# Patient Record
Sex: MALE | Race: WHITE | NOT HISPANIC OR LATINO | Employment: STUDENT | URBAN - METROPOLITAN AREA
[De-identification: names, ages, dates, MRNs, and addresses within clinical notes are randomized per-mention and may not be internally consistent; named-entity substitution may affect disease eponyms.]

---

## 2023-07-07 ENCOUNTER — APPOINTMENT (OUTPATIENT)
Dept: CT IMAGING | Facility: CLINIC | Age: 23
End: 2023-07-07
Attending: EMERGENCY MEDICINE
Payer: COMMERCIAL

## 2023-07-07 ENCOUNTER — HOSPITAL ENCOUNTER (EMERGENCY)
Facility: CLINIC | Age: 23
Discharge: HOME | End: 2023-07-07
Attending: EMERGENCY MEDICINE | Admitting: EMERGENCY MEDICINE
Payer: COMMERCIAL

## 2023-07-07 VITALS
DIASTOLIC BLOOD PRESSURE: 93 MMHG | OXYGEN SATURATION: 98 % | WEIGHT: 160 LBS | RESPIRATION RATE: 16 BRPM | SYSTOLIC BLOOD PRESSURE: 133 MMHG | HEIGHT: 74 IN | HEART RATE: 89 BPM | BODY MASS INDEX: 20.53 KG/M2 | TEMPERATURE: 97 F

## 2023-07-07 DIAGNOSIS — N13.5 OBSTRUCTION OF RIGHT URETEROPELVIC JUNCTION (UPJ): ICD-10-CM

## 2023-07-07 DIAGNOSIS — R79.89 ELEVATED SERUM CREATININE: ICD-10-CM

## 2023-07-07 DIAGNOSIS — Q62.11 HYDRONEPHROSIS WITH URETEROPELVIC JUNCTION (UPJ) OBSTRUCTION: Primary | ICD-10-CM

## 2023-07-07 LAB
ALBUMIN UR-MCNC: NEGATIVE MG/DL
ANION GAP SERPL CALCULATED.3IONS-SCNC: 9 MMOL/L (ref 7–15)
APPEARANCE UR: CLEAR
BASOPHILS # BLD AUTO: 0.1 10E3/UL (ref 0–0.2)
BASOPHILS NFR BLD AUTO: 1 %
BILIRUB UR QL STRIP: NEGATIVE
BUN SERPL-MCNC: 14.3 MG/DL (ref 6–20)
CALCIUM SERPL-MCNC: 8.9 MG/DL (ref 8.6–10)
CHLORIDE SERPL-SCNC: 98 MMOL/L (ref 98–107)
COLOR UR AUTO: ABNORMAL
CREAT SERPL-MCNC: 1.48 MG/DL (ref 0.67–1.17)
DEPRECATED HCO3 PLAS-SCNC: 26 MMOL/L (ref 22–29)
EOSINOPHIL # BLD AUTO: 0.1 10E3/UL (ref 0–0.7)
EOSINOPHIL NFR BLD AUTO: 1 %
ERYTHROCYTE [DISTWIDTH] IN BLOOD BY AUTOMATED COUNT: 11.7 % (ref 10–15)
GFR SERPL CREATININE-BSD FRML MDRD: 67 ML/MIN/1.73M2
GLUCOSE SERPL-MCNC: 97 MG/DL (ref 70–99)
GLUCOSE UR STRIP-MCNC: NEGATIVE MG/DL
HCT VFR BLD AUTO: 43.4 % (ref 40–53)
HGB BLD-MCNC: 15.5 G/DL (ref 13.3–17.7)
HGB UR QL STRIP: NEGATIVE
IMM GRANULOCYTES # BLD: 0.1 10E3/UL
IMM GRANULOCYTES NFR BLD: 0 %
KETONES UR STRIP-MCNC: 60 MG/DL
LEUKOCYTE ESTERASE UR QL STRIP: NEGATIVE
LYMPHOCYTES # BLD AUTO: 1.1 10E3/UL (ref 0.8–5.3)
LYMPHOCYTES NFR BLD AUTO: 8 %
MCH RBC QN AUTO: 32.2 PG (ref 26.5–33)
MCHC RBC AUTO-ENTMCNC: 35.7 G/DL (ref 31.5–36.5)
MCV RBC AUTO: 90 FL (ref 78–100)
MONOCYTES # BLD AUTO: 1.1 10E3/UL (ref 0–1.3)
MONOCYTES NFR BLD AUTO: 9 %
MUCOUS THREADS #/AREA URNS LPF: PRESENT /LPF
NEUTROPHILS # BLD AUTO: 10.5 10E3/UL (ref 1.6–8.3)
NEUTROPHILS NFR BLD AUTO: 81 %
NITRATE UR QL: NEGATIVE
NRBC # BLD AUTO: 0 10E3/UL
NRBC BLD AUTO-RTO: 0 /100
PH UR STRIP: 6 [PH] (ref 5–7)
PLATELET # BLD AUTO: 342 10E3/UL (ref 150–450)
POTASSIUM SERPL-SCNC: 3.8 MMOL/L (ref 3.4–5.3)
RBC # BLD AUTO: 4.82 10E6/UL (ref 4.4–5.9)
RBC URINE: 1 /HPF
SODIUM SERPL-SCNC: 133 MMOL/L (ref 136–145)
SP GR UR STRIP: 1.02 (ref 1–1.03)
UROBILINOGEN UR STRIP-MCNC: NORMAL MG/DL
WBC # BLD AUTO: 12.9 10E3/UL (ref 4–11)
WBC URINE: <1 /HPF

## 2023-07-07 PROCEDURE — 99285 EMERGENCY DEPT VISIT HI MDM: CPT | Mod: 25

## 2023-07-07 PROCEDURE — 85025 COMPLETE CBC W/AUTO DIFF WBC: CPT | Performed by: EMERGENCY MEDICINE

## 2023-07-07 PROCEDURE — 81001 URINALYSIS AUTO W/SCOPE: CPT | Performed by: EMERGENCY MEDICINE

## 2023-07-07 PROCEDURE — 36415 COLL VENOUS BLD VENIPUNCTURE: CPT | Performed by: EMERGENCY MEDICINE

## 2023-07-07 PROCEDURE — 74176 CT ABD & PELVIS W/O CONTRAST: CPT

## 2023-07-07 PROCEDURE — 250N000011 HC RX IP 250 OP 636: Mod: JZ | Performed by: EMERGENCY MEDICINE

## 2023-07-07 PROCEDURE — 80048 BASIC METABOLIC PNL TOTAL CA: CPT | Performed by: EMERGENCY MEDICINE

## 2023-07-07 PROCEDURE — 96374 THER/PROPH/DIAG INJ IV PUSH: CPT

## 2023-07-07 RX ORDER — KETOROLAC TROMETHAMINE 15 MG/ML
15 INJECTION, SOLUTION INTRAMUSCULAR; INTRAVENOUS
Status: COMPLETED | OUTPATIENT
Start: 2023-07-07 | End: 2023-07-07

## 2023-07-07 RX ORDER — OXYCODONE HYDROCHLORIDE 5 MG/1
5 TABLET ORAL EVERY 6 HOURS PRN
Qty: 12 TABLET | Refills: 0 | Status: SHIPPED | OUTPATIENT
Start: 2023-07-07 | End: 2023-07-10

## 2023-07-07 RX ORDER — SENNA AND DOCUSATE SODIUM 50; 8.6 MG/1; MG/1
1-2 TABLET, FILM COATED ORAL 2 TIMES DAILY PRN
Qty: 30 TABLET | Refills: 0 | Status: ON HOLD | OUTPATIENT
Start: 2023-07-07 | End: 2023-07-25

## 2023-07-07 RX ORDER — ONDANSETRON 4 MG/1
4 TABLET, ORALLY DISINTEGRATING ORAL EVERY 6 HOURS PRN
Qty: 14 TABLET | Refills: 0 | Status: ON HOLD | OUTPATIENT
Start: 2023-07-07 | End: 2023-07-25

## 2023-07-07 RX ADMIN — KETOROLAC TROMETHAMINE 15 MG: 15 INJECTION, SOLUTION INTRAMUSCULAR; INTRAVENOUS at 08:06

## 2023-07-07 ASSESSMENT — ACTIVITIES OF DAILY LIVING (ADL)
ADLS_ACUITY_SCORE: 35
ADLS_ACUITY_SCORE: 33

## 2023-07-07 NOTE — ED NOTES
"  History     Chief Complaint:  Flank Pain       HPI   Amador Jose is a 23 year old male with no significant past medical history who presents to the ED via/accompanied by self with a chief complaint of intermittent right flank pain, nonradiating onset approximately 36 hours ago associated with intermittent nausea, worse in the last 12 hours and improving shortly after arriving in the emergency department.  Patient denies chest pain, shortness of breath, vomiting, changes in bowel movements, changes in urination, pleuritic pain, specific alleviating or worsening factors.  He reports his last dose of Tylenol was at 0600 this morning.      Independent Historian: Patient provides the history    Review of External Notes: See MDM    ROS:  Review of Systems  Full ROS completed and negative other than pertinent positives and negatives noted in HPI    Allergies:  No Known Allergies     Medications:    ondansetron (ZOFRAN ODT) 4 MG ODT tab  oxyCODONE (ROXICODONE) 5 MG tablet  SENNA-docusate sodium (SENNA S) 8.6-50 MG tablet        Past Medical History:    None  Past Surgical History:    None    Family History:    family history is not on file.    Social History:     PCP: No primary care provider on file.     Physical Exam     Patient Vitals for the past 24 hrs:   BP Temp Temp src Pulse Resp SpO2 Height Weight   07/07/23 0609 (!) 157/101 97  F (36.1  C) Temporal 79 16 99 % 1.88 m (6' 2\") 72.6 kg (160 lb)        Physical Exam  Constitutional: Well developed, nontox appearance  Head: Atraumatic.   Neck:  no stridor  Eyes: no scleral icterus  Cardiovascular: RRR, 2+ bilat radial, DP pulses  Pulmonary/Chest: nml resp effort  Abdominal: ND, soft, NT, no rebound or guarding   : R CVA tenderness   Ext: Warm, well perfuse  Neurological: A&O, symmetric facies, moves ext x4  Skin: Skin is warm and dry.   Psychiatric: Behavior is normal. Thought content normal.   Nursing note and vitals reviewed.    Emergency Department Course "   ECG:  No results found for this or any previous visit.    Imaging:  CT Abdomen Pelvis w/o Contrast   Final Result   IMPRESSION:    1.  Moderate to severe right hydroureteronephrosis with abrupt   transition to normal caliber at the ureteropelvic junction. No   radiodense filling defect. Findings are favored to be due to chronic   intrinsic UPJ obstruction or extrinsic compression from a crossing   vessel.      TREVOR STANFORD MD            SYSTEM ID:  [id number]           Report per radiology unless otherwise specified in report or noted in MDM    Laboratory:  Labs Ordered and Resulted from Time of ED Arrival to Time of ED Departure   ROUTINE UA WITH MICROSCOPIC REFLEX TO CULTURE - Abnormal       Result Value    Color Urine Light Yellow      Appearance Urine Clear      Glucose Urine Negative      Bilirubin Urine Negative      Ketones Urine 60 (*)     Specific Gravity Urine 1.022      Blood Urine Negative      pH Urine 6.0      Protein Albumin Urine Negative      Urobilinogen Urine Normal      Nitrite Urine Negative      Leukocyte Esterase Urine Negative      Mucus Urine Present (*)     RBC Urine 1      WBC Urine <1     BASIC METABOLIC PANEL - Abnormal    Sodium 133 (*)     Potassium 3.8      Chloride 98      Carbon Dioxide (CO2) 26      Anion Gap 9      Urea Nitrogen 14.3      Creatinine 1.48 (*)     Calcium 8.9      Glucose 97      GFR Estimate 67     CBC WITH PLATELETS AND DIFFERENTIAL - Abnormal    WBC Count 12.9 (*)     RBC Count 4.82      Hemoglobin 15.5      Hematocrit 43.4      MCV 90      MCH 32.2      MCHC 35.7      RDW 11.7      Platelet Count 342      % Neutrophils 81      % Lymphocytes 8      % Monocytes 9      % Eosinophils 1      % Basophils 1      % Immature Granulocytes 0      NRBCs per 100 WBC 0      Absolute Neutrophils 10.5 (*)     Absolute Lymphocytes 1.1      Absolute Monocytes 1.1      Absolute Eosinophils 0.1      Absolute Basophils 0.1      Absolute Immature Granulocytes 0.1       Absolute NRBCs 0.0          Procedures       Emergency Department Course & Assessments:             Interventions:  Medications   ketorolac (TORADOL) injection 15 mg (15 mg Intravenous $Given 23 0806)        Independent Interpretation (X-rays, CTs, rhythm strip):  See MDM    Consultations/Discussion of Management or Tests:  I discussed patient with on-call urology, Sil Phipps    Social Determinants of Health affecting care:  See MDM    Disposition:  The patient was discharged to home.     Impression & Plan    CMS Diagnoses: None  Medical Decision Makin year old male presenting w/ right flank pain    Social determinants affecting patient's health include:  No significant social determinants negatively affecting the patient's health     I reviewed medical records from  for medicine office visit on 10/11/2022 for an overview of the patient's previous medical history    DDx includes ureteral stone, renal colic, UTI, pyelonephritis, atypical presentation of appendicitis, biliary colic, less likely.  Doubt vascular catastrophe given risk factors and history.  Labs significant for mild leukocytosis, elevated creatinine.  Imaging sig for findings concerning for right UPJ obstruction without evidence of ureteral stone.  I discussed the patient and case with on-call urology who recommended outpatient follow-up with the patient's pain is well controlled through likely surgical intervention.  Interventions as noted above with improvement in symptoms.  Upon recheck, the patient's pain is well controlled.  Plan for him to follow-up as an outpatient and discuss potential treatment options relayed from urology team.  At this time I feel the pt is safe for discharge.  Recommendations given regarding follow up with PCP, urology and return to the emergency department as needed for new or worsening symptoms.  Pt counseled on all results, disposition and diagnosis.  They are understanding and agreeable to plan. Patient  discharged in stable condition.        Diagnosis:    ICD-10-CM    1. Obstruction of right ureteropelvic junction (UPJ)  N13.5       2. Elevated serum creatinine  R79.89            Discharge Medications:  New Prescriptions    ONDANSETRON (ZOFRAN ODT) 4 MG ODT TAB    Take 1 tablet (4 mg) by mouth every 6 hours as needed for nausea or vomiting    OXYCODONE (ROXICODONE) 5 MG TABLET    Take 1 tablet (5 mg) by mouth every 6 hours as needed for pain    SENNA-DOCUSATE SODIUM (SENNA S) 8.6-50 MG TABLET    Take 1-2 tablets by mouth 2 times daily as needed (if taking oxycodone)        7/7/2023   Fareed Fagan MD Vaughn, Christopher E, MD  07/07/23 0907

## 2023-07-07 NOTE — ED TRIAGE NOTES
Right flank pain started 24 hr ago.  Took Tylenol 1000mg at 0600. Denies need for pain medication at this time

## 2023-07-07 NOTE — ED PROVIDER NOTES
"History      Chief Complaint:  Flank Pain        HPI   Amador Jose is a 23 year old male with no significant past medical history who presents to the ED via/accompanied by self with a chief complaint of intermittent right flank pain, nonradiating onset approximately 36 hours ago associated with intermittent nausea, worse in the last 12 hours and improving shortly after arriving in the emergency department.  Patient denies chest pain, shortness of breath, vomiting, changes in bowel movements, changes in urination, pleuritic pain, specific alleviating or worsening factors.  He reports his last dose of Tylenol was at 0600 this morning.        Independent Historian: Patient provides the history     Review of External Notes: See MDM     ROS:  Review of Systems  Full ROS completed and negative other than pertinent positives and negatives noted in HPI     Allergies:  No Known Allergies      Medications:    ondansetron (ZOFRAN ODT) 4 MG ODT tab  oxyCODONE (ROXICODONE) 5 MG tablet  SENNA-docusate sodium (SENNA S) 8.6-50 MG tablet           Past Medical History:    None  Past Surgical History:    None     Family History:    family history is not on file.     Social History:  PCP: No primary care provider on file.      Physical Exam      Patient Vitals for the past 24 hrs:    BP Temp Temp src Pulse Resp SpO2 Height Weight   07/07/23 0609 (!) 157/101 97  F (36.1  C) Temporal 79 16 99 % 1.88 m (6' 2\") 72.6 kg (160 lb)         Physical Exam  Constitutional: Well developed, nontox appearance  Head: Atraumatic.   Neck:  no stridor  Eyes: no scleral icterus  Cardiovascular: RRR, 2+ bilat radial, DP pulses  Pulmonary/Chest: nml resp effort  Abdominal: ND, soft, NT, no rebound or guarding   : R CVA tenderness   Ext: Warm, well perfuse  Neurological: A&O, symmetric facies, moves ext x4  Skin: Skin is warm and dry.   Psychiatric: Behavior is normal. Thought content normal.   Nursing note and vitals reviewed.     Emergency " Department Course   ECG:  No results found for this or any previous visit.     Imaging:  CT Abdomen Pelvis w/o Contrast   Final Result   IMPRESSION:    1.  Moderate to severe right hydroureteronephrosis with abrupt   transition to normal caliber at the ureteropelvic junction. No   radiodense filling defect. Findings are favored to be due to chronic   intrinsic UPJ obstruction or extrinsic compression from a crossing   vessel.       TREVOR STANFORD MD            SYSTEM ID:  [id number]             Report per radiology unless otherwise specified in report or noted in MDM     Laboratory:        Labs Ordered and Resulted from Time of ED Arrival to Time of ED Departure   ROUTINE UA WITH MICROSCOPIC REFLEX TO CULTURE - Abnormal       Result Value      Color Urine Light Yellow        Appearance Urine Clear        Glucose Urine Negative        Bilirubin Urine Negative        Ketones Urine 60 (*)       Specific Gravity Urine 1.022        Blood Urine Negative        pH Urine 6.0        Protein Albumin Urine Negative        Urobilinogen Urine Normal        Nitrite Urine Negative        Leukocyte Esterase Urine Negative        Mucus Urine Present (*)       RBC Urine 1        WBC Urine <1      BASIC METABOLIC PANEL - Abnormal     Sodium 133 (*)       Potassium 3.8        Chloride 98        Carbon Dioxide (CO2) 26        Anion Gap 9        Urea Nitrogen 14.3        Creatinine 1.48 (*)       Calcium 8.9        Glucose 97        GFR Estimate 67      CBC WITH PLATELETS AND DIFFERENTIAL - Abnormal     WBC Count 12.9 (*)       RBC Count 4.82        Hemoglobin 15.5        Hematocrit 43.4        MCV 90        MCH 32.2        MCHC 35.7        RDW 11.7        Platelet Count 342        % Neutrophils 81        % Lymphocytes 8        % Monocytes 9        % Eosinophils 1        % Basophils 1        % Immature Granulocytes 0        NRBCs per 100 WBC 0        Absolute Neutrophils 10.5 (*)       Absolute Lymphocytes 1.1        Absolute  Monocytes 1.1        Absolute Eosinophils 0.1        Absolute Basophils 0.1        Absolute Immature Granulocytes 0.1        Absolute NRBCs 0.0            Procedures         Emergency Department Course & Assessments:              Interventions:  Medications   ketorolac (TORADOL) injection 15 mg (15 mg Intravenous $Given 23 0806)         Independent Interpretation (X-rays, CTs, rhythm strip):  See MDM     Consultations/Discussion of Management or Tests:  I discussed patient with on-call urology, Sil Phipps     Social Determinants of Health affecting care:  See MDM     Disposition:  The patient was discharged to home.      Impression & Plan    CMS Diagnoses: None  Medical Decision Makin year old male presenting w/ right flank pain     Social determinants affecting patient's health include:  No significant social determinants negatively affecting the patient's health     I reviewed medical records from  for medicine office visit on 10/11/2022 for an overview of the patient's previous medical history     DDx includes ureteral stone, renal colic, UTI, pyelonephritis, atypical presentation of appendicitis, biliary colic, less likely.  Doubt vascular catastrophe given risk factors and history.  Labs significant for mild leukocytosis, elevated creatinine.  Imaging sig for findings concerning for right UPJ obstruction without evidence of ureteral stone.  I discussed the patient and case with on-call urology who recommended outpatient follow-up with the patient's pain is well controlled through likely surgical intervention.  Interventions as noted above with improvement in symptoms.  Upon recheck, the patient's pain is well controlled.  Plan for him to follow-up as an outpatient and discuss potential treatment options relayed from urology team.  At this time I feel the pt is safe for discharge.  Recommendations given regarding follow up with PCP, urology and return to the emergency department as needed for new  or worsening symptoms.  Pt counseled on all results, disposition and diagnosis.  They are understanding and agreeable to plan. Patient discharged in stable condition.          Diagnosis:      ICD-10-CM     1. Obstruction of right ureteropelvic junction (UPJ)  N13.5         2. Elevated serum creatinine  R79.89               Discharge Medications:       New Prescriptions     ONDANSETRON (ZOFRAN ODT) 4 MG ODT TAB    Take 1 tablet (4 mg) by mouth every 6 hours as needed for nausea or vomiting     OXYCODONE (ROXICODONE) 5 MG TABLET    Take 1 tablet (5 mg) by mouth every 6 hours as needed for pain     SENNA-DOCUSATE SODIUM (SENNA S) 8.6-50 MG TABLET    Take 1-2 tablets by mouth 2 times daily as needed (if taking oxycodone)         7/7/2023   Fareed Fagan MD Vaughn, Christopher E, MD  07/07/23 0907       Fareed Fagan MD  07/07/23 0283

## 2023-07-07 NOTE — DISCHARGE INSTRUCTIONS
1. -Take acetaminophen 500 to 1000 mg by mouth every 4 to 6 hours as needed for pain or fever.  Do not take more than 4000 mg in 24 hours.  Do not take within 6 hours of another acetaminophen containing medication such as norco (vicodin) or percocet.  - Take ibuprofen 600 to 800 mg by mouth every 6 to 8 hours as needed for pain or fever  2. Please return to the ED as needed for new or worsening symptoms such as severe and uncontrollable pain, vomiting and unable to keep anything down, fever greater than 100.4 F, any other concerning symptoms.

## 2023-07-07 NOTE — ED NOTES
Pt refusing Toradol at this time. He verbalizes understanding to call if he decides he would like pain medication.

## 2023-07-11 ENCOUNTER — VIRTUAL VISIT (OUTPATIENT)
Dept: UROLOGY | Facility: CLINIC | Age: 23
End: 2023-07-11
Payer: COMMERCIAL

## 2023-07-11 ENCOUNTER — HOSPITAL ENCOUNTER (OUTPATIENT)
Dept: NUCLEAR MEDICINE | Facility: CLINIC | Age: 23
Setting detail: NUCLEAR MEDICINE
Discharge: HOME | End: 2023-07-11
Attending: PHYSICIAN ASSISTANT | Admitting: PHYSICIAN ASSISTANT
Payer: COMMERCIAL

## 2023-07-11 VITALS — WEIGHT: 160 LBS | BODY MASS INDEX: 20.53 KG/M2 | HEIGHT: 74 IN

## 2023-07-11 DIAGNOSIS — N13.5 OBSTRUCTION OF RIGHT URETEROPELVIC JUNCTION (UPJ): Primary | ICD-10-CM

## 2023-07-11 DIAGNOSIS — Q62.11 HYDRONEPHROSIS WITH URETEROPELVIC JUNCTION (UPJ) OBSTRUCTION: ICD-10-CM

## 2023-07-11 PROCEDURE — 250N000011 HC RX IP 250 OP 636: Mod: JZ | Performed by: PHYSICIAN ASSISTANT

## 2023-07-11 PROCEDURE — 78708 K FLOW/FUNCT IMAGE W/DRUG: CPT

## 2023-07-11 PROCEDURE — 343N000001 HC RX 343: Performed by: PHYSICIAN ASSISTANT

## 2023-07-11 PROCEDURE — A9562 TC99M MERTIATIDE: HCPCS | Performed by: PHYSICIAN ASSISTANT

## 2023-07-11 PROCEDURE — 99204 OFFICE O/P NEW MOD 45 MIN: CPT | Mod: VID | Performed by: STUDENT IN AN ORGANIZED HEALTH CARE EDUCATION/TRAINING PROGRAM

## 2023-07-11 RX ORDER — FUROSEMIDE 10 MG/ML
40 INJECTION INTRAMUSCULAR; INTRAVENOUS ONCE
Status: COMPLETED | OUTPATIENT
Start: 2023-07-11 | End: 2023-07-11

## 2023-07-11 RX ORDER — CEFAZOLIN SODIUM 2 G/50ML
2 SOLUTION INTRAVENOUS
Status: CANCELLED | OUTPATIENT
Start: 2023-07-11

## 2023-07-11 RX ORDER — CEFAZOLIN SODIUM 2 G/50ML
2 SOLUTION INTRAVENOUS SEE ADMIN INSTRUCTIONS
Status: CANCELLED | OUTPATIENT
Start: 2023-07-11

## 2023-07-11 RX ADMIN — TECHNESCAN TC 99M MERTIATIDE 8.8 MILLICURIE: 1 INJECTION, POWDER, LYOPHILIZED, FOR SOLUTION INTRAVENOUS at 08:30

## 2023-07-11 RX ADMIN — FUROSEMIDE 40 MG: 10 INJECTION, SOLUTION INTRAMUSCULAR; INTRAVENOUS at 08:50

## 2023-07-11 ASSESSMENT — PAIN SCALES - GENERAL: PAINLEVEL: NO PAIN (0)

## 2023-07-11 NOTE — LETTER
7/11/2023       RE: Amador Jose  [address] [facility] [address]     Dear Colleague,    Thank you for referring your patient, Amador Jose, to the [facility] [place] at [facility]. Please see a copy of my visit note below.    ** Send link to cell phone    Amador is a 23 year old who is being evaluated via a billable video visit.      How would you like to obtain your AVS? MyChart  If the video visit is dropped, the invitation should be resent by: Text to cell phone: [phone]  Will anyone else be joining your video visit? No      [facility]  Main Office: [address]  [address]  [place] [address]       CHIEF COMPLAINT:  Right flank pain    HISTORY:   22 yo M with right flank pain x1 year with acute worsening last week prompting ER visit    Patient notices intermittent pain especially with increased fluid intake    Denies any UTI. Denies any prior  surgery    Patient is starting at [employer], orientation starting August 14th. He recently moved into his new apartment in [facility] [facility] [place]      PAST MEDICAL HISTORY: History reviewed. No pertinent past medical history.    PAST SURGICAL HISTORY: History reviewed. No pertinent surgical history.    FAMILY HISTORY: History reviewed. No pertinent family history.    SOCIAL HISTORY:   Social History     Tobacco Use    Smoking status: Never    Smokeless tobacco: Never   Substance Use Topics    Alcohol use: Not on file          Allergies   Allergen Reactions    Fluorescein-Benoxinate Other (See Comments)         Current Outpatient Medications:     ondansetron (ZOFRAN ODT) 4 MG ODT tab, Take 1 tablet (4 mg) by mouth every 6 hours as needed for nausea or vomiting (Patient not taking: Reported on 7/11/2023), Disp: 14 tablet, Rfl: 0    SENNA-docusate sodium (SENNA S) 8.6-50 MG tablet, Take 1-2 tablets by mouth 2 times daily as needed (if taking oxycodone) (Patient  "not taking: Reported on 7/11/2023), Disp: 30 tablet, Rfl: 0  No current facility-administered medications for this visit.    Review Of Systems:  Skin: No rash, pruritis, or skin pigmentation  Eyes: No changes in vision  Ears/Nose/Throat: No changes in hearing, no nosebleeds  Respiratory: No shortness of breath, dyspnea on exertion, cough, or hemoptysis  Cardiovascular: No chest pain or palpitations  Gastrointestinal: No diarrhea or constipation. No abdominal pain. No hematochezia  Genitourinary: see HPI  Musculoskeletal: No pain or swelling of joints, normal range of motion  Neurologic: No weakness or tremors  Psychiatric: No recent changes in memory or mood  Hematologic/Lymphatic/Immunologic: No easy bruising or enlarged lymph nodes  Endocrine: No weight gain or loss      PHYSICAL EXAM:    Ht 1.88 m (6' 2\")   Wt 72.6 kg (160 lb)   BMI 20.54 kg/m    General appearance: In NAD, conversant  HEENT: Normocephalic and atraumatic, anicteric sclera  Cardiovascular: Not examined  Respiratory: normal, non-labored breathing  Musculoskeletal: Not Examined  Peripheral Vascular/extremity: No peripheral edema  Skin: Normal temperature, turgor, and texture. No rash  Psychiatric: Appropriate affect, alert and oriented to person, place, and time      Cystoscopy: Not done        UA RESULTS:  Recent Labs   Lab Test 07/07/23  0616   COLOR Light Yellow   APPEARANCE Clear   URINEGLC Negative   URINEBILI Negative   URINEKETONE 60*   SG 1.022   UBLD Negative   URINEPH 6.0   PROTEIN Negative   NITRITE Negative   LEUKEST Negative   RBCU 1   WBCU <1       Bladder Scan:     Other Labs:      Imaging Studies:     NM renal scan 7/11/2023    Reviewed and interpreted personally and discussed with patient        Right renal uptake without significant excretion, consistent with right UPJO    CT 7/7/2023 reviewed and interpreted personally    Right hydronephrosis without hydroureter    CLINICAL IMPRESSION:   22 yo M with 1 year of intermittent right " flank pain found to have right ureteropelvic junction obstruction. Clinical course is classic (Dietl's crisis). 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 Robotic assisted laparoscopic dismembered pyeloplasty vs. Laser endopyelotomy vs. Chronic indwelling ureteral stent. Pyeloplasty with the best long term outcomes. Risks of pyeloplasty including damage to bowel, ureteral leak, ureteral stenosis/recurrent UPJO, infection, hematuria discussed. Plan for postop stent for 6 weeks. 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 Location): Home  Distant Location (provider location):  On-site  Platform used for Video Visit: Hemal

## 2023-07-11 NOTE — PROGRESS NOTES
** Send link to cell phone    Amador is a 23 year old who is being evaluated via a billable video visit.      How would you like to obtain your AVS? MyChart  If the video visit is dropped, the invitation should be resent by: Text to cell phone: [phone]  Will anyone else be joining your video visit? No      [facility]  Main Office: [address]  [address]  [place] [address]       CHIEF COMPLAINT:  Right flank pain    HISTORY:   24 yo M with right flank pain x1 year with acute worsening last week prompting ER visit    Patient notices intermittent pain especially with increased fluid intake    Denies any UTI. Denies any prior  surgery    Patient is starting at [employer], orientation starting August 14th. He recently moved into his new apartment in [place]      PAST MEDICAL HISTORY: History reviewed. No pertinent past medical history.    PAST SURGICAL HISTORY: History reviewed. No pertinent surgical history.    FAMILY HISTORY: History reviewed. No pertinent family history.    SOCIAL HISTORY:   Social History     Tobacco Use     Smoking status: Never     Smokeless tobacco: Never   Substance Use Topics     Alcohol use: Not on file          Allergies   Allergen Reactions     Fluorescein-Benoxinate Other (See Comments)         Current Outpatient Medications:      ondansetron (ZOFRAN ODT) 4 MG ODT tab, Take 1 tablet (4 mg) by mouth every 6 hours as needed for nausea or vomiting (Patient not taking: Reported on 7/11/2023), Disp: 14 tablet, Rfl: 0     SENNA-docusate sodium (SENNA S) 8.6-50 MG tablet, Take 1-2 tablets by mouth 2 times daily as needed (if taking oxycodone) (Patient not taking: Reported on 7/11/2023), Disp: 30 tablet, Rfl: 0  No current facility-administered medications for this visit.    Review Of Systems:  Skin: No rash, pruritis, or skin pigmentation  Eyes: No changes in vision  Ears/Nose/Throat: No changes in hearing, no nosebleeds  Respiratory: No shortness  "of breath, dyspnea on exertion, cough, or hemoptysis  Cardiovascular: No chest pain or palpitations  Gastrointestinal: No diarrhea or constipation. No abdominal pain. No hematochezia  Genitourinary: see HPI  Musculoskeletal: No pain or swelling of joints, normal range of motion  Neurologic: No weakness or tremors  Psychiatric: No recent changes in memory or mood  Hematologic/Lymphatic/Immunologic: No easy bruising or enlarged lymph nodes  Endocrine: No weight gain or loss      PHYSICAL EXAM:    Ht 1.88 m (6' 2\")   Wt 72.6 kg (160 lb)   BMI 20.54 kg/m    General appearance: In NAD, conversant  HEENT: Normocephalic and atraumatic, anicteric sclera  Cardiovascular: Not examined  Respiratory: normal, non-labored breathing  Musculoskeletal: Not Examined  Peripheral Vascular/extremity: No peripheral edema  Skin: Normal temperature, turgor, and texture. No rash  Psychiatric: Appropriate affect, alert and oriented to person, place, and time      Cystoscopy: Not done        UA RESULTS:  Recent Labs   Lab Test 07/07/23  0616   COLOR Light Yellow   APPEARANCE Clear   URINEGLC Negative   URINEBILI Negative   URINEKETONE 60*   SG 1.022   UBLD Negative   URINEPH 6.0   PROTEIN Negative   NITRITE Negative   LEUKEST Negative   RBCU 1   WBCU <1       Bladder Scan:     Other Labs:      Imaging Studies:     NM renal scan 7/11/2023    Reviewed and interpreted personally and discussed with patient        Right renal uptake without significant excretion, consistent with right UPJO    CT 7/7/2023 reviewed and interpreted personally    Right hydronephrosis without hydroureter    CLINICAL IMPRESSION:   22 yo M with 1 year of intermittent right flank pain found to have right ureteropelvic junction obstruction. Clinical course is classic (Dietl's crisis). 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 Robotic assisted " laparoscopic dismembered pyeloplasty vs. Laser endopyelotomy vs. Chronic indwelling ureteral stent. Pyeloplasty with the best long term outcomes. Risks of pyeloplasty including damage to bowel, ureteral leak, ureteral stenosis/recurrent UPJO, infection, hematuria discussed. Plan for postop stent for 6 weeks. 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 Location): Home  Distant Location (provider location):  On-site  Platform used for Video Visit: Hemal

## 2023-07-11 NOTE — H&P (VIEW-ONLY)
** Send link to cell phone    Amador is a 23 year old who is being evaluated via a billable video visit.      How would you like to obtain your AVS? MyChart  If the video visit is dropped, the invitation should be resent by: Text to cell phone: [phone]  Will anyone else be joining your video visit? No      [facility]  Main Office: [address]  [address]  [place] [address]       CHIEF COMPLAINT:  Right flank pain    HISTORY:   24 yo M with right flank pain x1 year with acute worsening last week prompting ER visit    Patient notices intermittent pain especially with increased fluid intake    Denies any UTI. Denies any prior  surgery    Patient is starting at [employer], orientation starting August 14th. He recently moved into his new apartment in [facility]      PAST MEDICAL HISTORY: History reviewed. No pertinent past medical history.    PAST SURGICAL HISTORY: History reviewed. No pertinent surgical history.    FAMILY HISTORY: History reviewed. No pertinent family history.    SOCIAL HISTORY:   Social History     Tobacco Use     Smoking status: Never     Smokeless tobacco: Never   Substance Use Topics     Alcohol use: Not on file          Allergies   Allergen Reactions     Fluorescein-Benoxinate Other (See Comments)         Current Outpatient Medications:      ondansetron (ZOFRAN ODT) 4 MG ODT tab, Take 1 tablet (4 mg) by mouth every 6 hours as needed for nausea or vomiting (Patient not taking: Reported on 7/11/2023), Disp: 14 tablet, Rfl: 0     SENNA-docusate sodium (SENNA S) 8.6-50 MG tablet, Take 1-2 tablets by mouth 2 times daily as needed (if taking oxycodone) (Patient not taking: Reported on 7/11/2023), Disp: 30 tablet, Rfl: 0  No current facility-administered medications for this visit.    Review Of Systems:  Skin: No rash, pruritis, or skin pigmentation  Eyes: No changes in vision  Ears/Nose/Throat: No changes in hearing, no nosebleeds  Respiratory: No shortness  "of breath, dyspnea on exertion, cough, or hemoptysis  Cardiovascular: No chest pain or palpitations  Gastrointestinal: No diarrhea or constipation. No abdominal pain. No hematochezia  Genitourinary: see HPI  Musculoskeletal: No pain or swelling of joints, normal range of motion  Neurologic: No weakness or tremors  Psychiatric: No recent changes in memory or mood  Hematologic/Lymphatic/Immunologic: No easy bruising or enlarged lymph nodes  Endocrine: No weight gain or loss      PHYSICAL EXAM:    Ht 1.88 m (6' 2\")   Wt 72.6 kg (160 lb)   BMI 20.54 kg/m    General appearance: In NAD, conversant  HEENT: Normocephalic and atraumatic, anicteric sclera  Cardiovascular: Not examined  Respiratory: normal, non-labored breathing  Musculoskeletal: Not Examined  Peripheral Vascular/extremity: No peripheral edema  Skin: Normal temperature, turgor, and texture. No rash  Psychiatric: Appropriate affect, alert and oriented to person, place, and time      Cystoscopy: Not done        UA RESULTS:  Recent Labs   Lab Test 07/07/23  0616   COLOR Light Yellow   APPEARANCE Clear   URINEGLC Negative   URINEBILI Negative   URINEKETONE 60*   SG 1.022   UBLD Negative   URINEPH 6.0   PROTEIN Negative   NITRITE Negative   LEUKEST Negative   RBCU 1   WBCU <1       Bladder Scan:     Other Labs:      Imaging Studies:     NM renal scan 7/11/2023    Reviewed and interpreted personally and discussed with patient        Right renal uptake without significant excretion, consistent with right UPJO    CT 7/7/2023 reviewed and interpreted personally    Right hydronephrosis without hydroureter    CLINICAL IMPRESSION:   24 yo M with 1 year of intermittent right flank pain found to have right ureteropelvic junction obstruction. Clinical course is classic (Dietl's crisis). 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 Robotic assisted " laparoscopic dismembered pyeloplasty vs. Laser endopyelotomy vs. Chronic indwelling ureteral stent. Pyeloplasty with the best long term outcomes. Risks of pyeloplasty including damage to bowel, ureteral leak, ureteral stenosis/recurrent UPJO, infection, hematuria discussed. Plan for postop stent for 6 weeks. N.b. 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 Location): Home  Distant Location (provider location):  On-site  Platform used for Video Visit: Hemal

## 2023-07-13 ENCOUNTER — TELEPHONE (OUTPATIENT)
Dept: UROLOGY | Facility: CLINIC | Age: 23
End: 2023-07-13
Payer: COMMERCIAL

## 2023-07-13 NOTE — TELEPHONE ENCOUNTER
Type of surgery Robot assisted Right Pyeloplasty right stent  Location of surgery: [facility]  Date and time of surgery:7/25/23 1:00pm  Surgeon:Dr Mckee  Pre-Op Appt Date Will use ED note from 7/7/23  Post-Op Appt Date: TBD   Packet sent out: Yes    Maria E

## 2023-07-18 ENCOUNTER — ANCILLARY PROCEDURE (OUTPATIENT)
Dept: CT IMAGING | Facility: CLINIC | Age: 23
End: 2023-07-18
Attending: STUDENT IN AN ORGANIZED HEALTH CARE EDUCATION/TRAINING PROGRAM
Payer: COMMERCIAL

## 2023-07-18 DIAGNOSIS — N13.5 OBSTRUCTION OF RIGHT URETEROPELVIC JUNCTION (UPJ): ICD-10-CM

## 2023-07-18 PROCEDURE — 74178 CT ABD&PLV WO CNTR FLWD CNTR: CPT

## 2023-07-18 PROCEDURE — 250N000009 HC RX 250: Performed by: STUDENT IN AN ORGANIZED HEALTH CARE EDUCATION/TRAINING PROGRAM

## 2023-07-18 PROCEDURE — 250N000011 HC RX IP 250 OP 636: Mod: JZ | Performed by: STUDENT IN AN ORGANIZED HEALTH CARE EDUCATION/TRAINING PROGRAM

## 2023-07-18 RX ORDER — IOPAMIDOL 755 MG/ML
100 INJECTION, SOLUTION INTRAVASCULAR ONCE
Status: COMPLETED | OUTPATIENT
Start: 2023-07-18 | End: 2023-07-18

## 2023-07-18 RX ADMIN — SODIUM CHLORIDE 40 ML: 9 INJECTION, SOLUTION INTRAVENOUS at 08:17

## 2023-07-18 RX ADMIN — IOPAMIDOL 100 ML: 755 INJECTION, SOLUTION INTRAVENOUS at 08:17

## 2023-07-25 ENCOUNTER — ANESTHESIA EVENT (OUTPATIENT)
Dept: SURGERY | Facility: CLINIC | Age: 23
End: 2023-07-25
Payer: COMMERCIAL

## 2023-07-25 ENCOUNTER — HOSPITAL ENCOUNTER (OUTPATIENT)
Facility: CLINIC | Age: 23
Discharge: HOME | End: 2023-07-26
Attending: STUDENT IN AN ORGANIZED HEALTH CARE EDUCATION/TRAINING PROGRAM | Admitting: STUDENT IN AN ORGANIZED HEALTH CARE EDUCATION/TRAINING PROGRAM
Payer: COMMERCIAL

## 2023-07-25 ENCOUNTER — ANESTHESIA (OUTPATIENT)
Dept: SURGERY | Facility: CLINIC | Age: 23
End: 2023-07-25
Payer: COMMERCIAL

## 2023-07-25 ENCOUNTER — APPOINTMENT (OUTPATIENT)
Dept: GENERAL RADIOLOGY | Facility: CLINIC | Age: 23
End: 2023-07-25
Attending: STUDENT IN AN ORGANIZED HEALTH CARE EDUCATION/TRAINING PROGRAM
Payer: COMMERCIAL

## 2023-07-25 DIAGNOSIS — Q62.39 UPJ OBSTRUCTION, CONGENITAL: Primary | ICD-10-CM

## 2023-07-25 LAB
ABO/RH(D): NORMAL
ANTIBODY SCREEN: NEGATIVE
SPECIMEN EXPIRATION DATE: NORMAL

## 2023-07-25 PROCEDURE — 250N000011 HC RX IP 250 OP 636: Performed by: NURSE ANESTHETIST, CERTIFIED REGISTERED

## 2023-07-25 PROCEDURE — 88305 TISSUE EXAM BY PATHOLOGIST: CPT | Mod: 26 | Performed by: PATHOLOGY

## 2023-07-25 PROCEDURE — C1769 GUIDE WIRE: HCPCS | Performed by: STUDENT IN AN ORGANIZED HEALTH CARE EDUCATION/TRAINING PROGRAM

## 2023-07-25 PROCEDURE — 258N000003 HC RX IP 258 OP 636: Performed by: STUDENT IN AN ORGANIZED HEALTH CARE EDUCATION/TRAINING PROGRAM

## 2023-07-25 PROCEDURE — 36415 COLL VENOUS BLD VENIPUNCTURE: CPT | Performed by: NURSE ANESTHETIST, CERTIFIED REGISTERED

## 2023-07-25 PROCEDURE — 250N000009 HC RX 250: Performed by: STUDENT IN AN ORGANIZED HEALTH CARE EDUCATION/TRAINING PROGRAM

## 2023-07-25 PROCEDURE — 88305 TISSUE EXAM BY PATHOLOGIST: CPT | Mod: TC | Performed by: STUDENT IN AN ORGANIZED HEALTH CARE EDUCATION/TRAINING PROGRAM

## 2023-07-25 PROCEDURE — 258N000001 HC RX 258: Performed by: STUDENT IN AN ORGANIZED HEALTH CARE EDUCATION/TRAINING PROGRAM

## 2023-07-25 PROCEDURE — 250N000009 HC RX 250: Performed by: ANESTHESIOLOGY

## 2023-07-25 PROCEDURE — 250N000009 HC RX 250: Performed by: NURSE ANESTHETIST, CERTIFIED REGISTERED

## 2023-07-25 PROCEDURE — 86850 RBC ANTIBODY SCREEN: CPT | Performed by: NURSE ANESTHETIST, CERTIFIED REGISTERED

## 2023-07-25 PROCEDURE — 250N000011 HC RX IP 250 OP 636: Performed by: STUDENT IN AN ORGANIZED HEALTH CARE EDUCATION/TRAINING PROGRAM

## 2023-07-25 PROCEDURE — 250N000011 HC RX IP 250 OP 636: Mod: JZ | Performed by: STUDENT IN AN ORGANIZED HEALTH CARE EDUCATION/TRAINING PROGRAM

## 2023-07-25 PROCEDURE — 50544 LAPAROSCOPY PYELOPLASTY: CPT | Mod: RT | Performed by: STUDENT IN AN ORGANIZED HEALTH CARE EDUCATION/TRAINING PROGRAM

## 2023-07-25 PROCEDURE — 250N000025 HC SEVOFLURANE, PER MIN: Performed by: STUDENT IN AN ORGANIZED HEALTH CARE EDUCATION/TRAINING PROGRAM

## 2023-07-25 PROCEDURE — C2617 STENT, NON-COR, TEM W/O DEL: HCPCS | Performed by: STUDENT IN AN ORGANIZED HEALTH CARE EDUCATION/TRAINING PROGRAM

## 2023-07-25 PROCEDURE — 52332 CYSTOSCOPY AND TREATMENT: CPT | Mod: RT | Performed by: STUDENT IN AN ORGANIZED HEALTH CARE EDUCATION/TRAINING PROGRAM

## 2023-07-25 PROCEDURE — 999N000063 XR ABDOMEN PORT 1 VIEW

## 2023-07-25 PROCEDURE — 710N000009 HC RECOVERY PHASE 1, LEVEL 1, PER MIN: Performed by: STUDENT IN AN ORGANIZED HEALTH CARE EDUCATION/TRAINING PROGRAM

## 2023-07-25 PROCEDURE — 250N000011 HC RX IP 250 OP 636: Performed by: ANESTHESIOLOGY

## 2023-07-25 PROCEDURE — 86901 BLOOD TYPING SEROLOGIC RH(D): CPT | Performed by: NURSE ANESTHETIST, CERTIFIED REGISTERED

## 2023-07-25 PROCEDURE — 258N000003 HC RX IP 258 OP 636: Performed by: NURSE ANESTHETIST, CERTIFIED REGISTERED

## 2023-07-25 PROCEDURE — 250N000013 HC RX MED GY IP 250 OP 250 PS 637: Performed by: STUDENT IN AN ORGANIZED HEALTH CARE EDUCATION/TRAINING PROGRAM

## 2023-07-25 PROCEDURE — 370N000017 HC ANESTHESIA TECHNICAL FEE, PER MIN: Performed by: STUDENT IN AN ORGANIZED HEALTH CARE EDUCATION/TRAINING PROGRAM

## 2023-07-25 PROCEDURE — 272N000001 HC OR GENERAL SUPPLY STERILE: Performed by: STUDENT IN AN ORGANIZED HEALTH CARE EDUCATION/TRAINING PROGRAM

## 2023-07-25 PROCEDURE — 360N000080 HC SURGERY LEVEL 7, PER MIN: Performed by: STUDENT IN AN ORGANIZED HEALTH CARE EDUCATION/TRAINING PROGRAM

## 2023-07-25 PROCEDURE — 999N000141 HC STATISTIC PRE-PROCEDURE NURSING ASSESSMENT: Performed by: STUDENT IN AN ORGANIZED HEALTH CARE EDUCATION/TRAINING PROGRAM

## 2023-07-25 DEVICE — STENT URETERAL POLARIS ULTRA 6FRX28CM M0061921340: Type: IMPLANTABLE DEVICE | Site: URETER | Status: FUNCTIONAL

## 2023-07-25 RX ORDER — PROPOFOL 10 MG/ML
INJECTION, EMULSION INTRAVENOUS PRN
Status: DISCONTINUED | OUTPATIENT
Start: 2023-07-25 | End: 2023-07-25

## 2023-07-25 RX ORDER — TAMSULOSIN HYDROCHLORIDE 0.4 MG/1
0.4 CAPSULE ORAL DAILY
Qty: 50 CAPSULE | Refills: 1 | Status: SHIPPED | OUTPATIENT
Start: 2023-07-25 | End: 2023-09-08

## 2023-07-25 RX ORDER — GLYCOPYRROLATE 0.2 MG/ML
INJECTION, SOLUTION INTRAMUSCULAR; INTRAVENOUS PRN
Status: DISCONTINUED | OUTPATIENT
Start: 2023-07-25 | End: 2023-07-25

## 2023-07-25 RX ORDER — OXYBUTYNIN CHLORIDE 5 MG/1
5 TABLET ORAL 3 TIMES DAILY
Qty: 30 TABLET | Refills: 1 | Status: SHIPPED | OUTPATIENT
Start: 2023-07-25 | End: 2023-09-08

## 2023-07-25 RX ORDER — NALOXONE HYDROCHLORIDE 0.4 MG/ML
0.4 INJECTION, SOLUTION INTRAMUSCULAR; INTRAVENOUS; SUBCUTANEOUS
Status: DISCONTINUED | OUTPATIENT
Start: 2023-07-25 | End: 2023-07-26 | Stop reason: HOSPADM

## 2023-07-25 RX ORDER — SODIUM CHLORIDE, SODIUM LACTATE, POTASSIUM CHLORIDE, CALCIUM CHLORIDE 600; 310; 30; 20 MG/100ML; MG/100ML; MG/100ML; MG/100ML
INJECTION, SOLUTION INTRAVENOUS CONTINUOUS PRN
Status: DISCONTINUED | OUTPATIENT
Start: 2023-07-25 | End: 2023-07-25

## 2023-07-25 RX ORDER — PROCHLORPERAZINE MALEATE 10 MG
10 TABLET ORAL EVERY 6 HOURS PRN
Status: DISCONTINUED | OUTPATIENT
Start: 2023-07-25 | End: 2023-07-26 | Stop reason: HOSPADM

## 2023-07-25 RX ORDER — FENTANYL CITRATE 0.05 MG/ML
25 INJECTION, SOLUTION INTRAMUSCULAR; INTRAVENOUS EVERY 5 MIN PRN
Status: DISCONTINUED | OUTPATIENT
Start: 2023-07-25 | End: 2023-07-25 | Stop reason: HOSPADM

## 2023-07-25 RX ORDER — CEFAZOLIN SODIUM/WATER 2 G/20 ML
2 SYRINGE (ML) INTRAVENOUS
Status: COMPLETED | OUTPATIENT
Start: 2023-07-25 | End: 2023-07-25

## 2023-07-25 RX ORDER — MAGNESIUM HYDROXIDE 1200 MG/15ML
LIQUID ORAL PRN
Status: DISCONTINUED | OUTPATIENT
Start: 2023-07-25 | End: 2023-07-25 | Stop reason: HOSPADM

## 2023-07-25 RX ORDER — HYDROMORPHONE HCL IN WATER/PF 6 MG/30 ML
0.2 PATIENT CONTROLLED ANALGESIA SYRINGE INTRAVENOUS
Status: DISCONTINUED | OUTPATIENT
Start: 2023-07-25 | End: 2023-07-26 | Stop reason: HOSPADM

## 2023-07-25 RX ORDER — HYDRALAZINE HYDROCHLORIDE 20 MG/ML
2.5-5 INJECTION INTRAMUSCULAR; INTRAVENOUS EVERY 10 MIN PRN
Status: DISCONTINUED | OUTPATIENT
Start: 2023-07-25 | End: 2023-07-25 | Stop reason: HOSPADM

## 2023-07-25 RX ORDER — CEFAZOLIN SODIUM 2 G/100ML
2 INJECTION, SOLUTION INTRAVENOUS EVERY 8 HOURS
Status: DISCONTINUED | OUTPATIENT
Start: 2023-07-26 | End: 2023-07-26 | Stop reason: HOSPADM

## 2023-07-25 RX ORDER — HYDROMORPHONE HCL IN WATER/PF 6 MG/30 ML
0.4 PATIENT CONTROLLED ANALGESIA SYRINGE INTRAVENOUS
Status: DISCONTINUED | OUTPATIENT
Start: 2023-07-25 | End: 2023-07-26 | Stop reason: HOSPADM

## 2023-07-25 RX ORDER — NALOXONE HYDROCHLORIDE 0.4 MG/ML
0.2 INJECTION, SOLUTION INTRAMUSCULAR; INTRAVENOUS; SUBCUTANEOUS
Status: DISCONTINUED | OUTPATIENT
Start: 2023-07-25 | End: 2023-07-26 | Stop reason: HOSPADM

## 2023-07-25 RX ORDER — OXYCODONE HYDROCHLORIDE 5 MG/1
5 TABLET ORAL EVERY 4 HOURS PRN
Status: DISCONTINUED | OUTPATIENT
Start: 2023-07-25 | End: 2023-07-26 | Stop reason: HOSPADM

## 2023-07-25 RX ORDER — SODIUM CHLORIDE, SODIUM LACTATE, POTASSIUM CHLORIDE, CALCIUM CHLORIDE 600; 310; 30; 20 MG/100ML; MG/100ML; MG/100ML; MG/100ML
INJECTION, SOLUTION INTRAVENOUS CONTINUOUS
Status: DISCONTINUED | OUTPATIENT
Start: 2023-07-25 | End: 2023-07-25 | Stop reason: HOSPADM

## 2023-07-25 RX ORDER — ONDANSETRON 4 MG/1
4 TABLET, ORALLY DISINTEGRATING ORAL EVERY 6 HOURS PRN
Status: DISCONTINUED | OUTPATIENT
Start: 2023-07-25 | End: 2023-07-26 | Stop reason: HOSPADM

## 2023-07-25 RX ORDER — CEFAZOLIN SODIUM/WATER 2 G/20 ML
2 SYRINGE (ML) INTRAVENOUS EVERY 8 HOURS
Status: DISCONTINUED | OUTPATIENT
Start: 2023-07-26 | End: 2023-07-25

## 2023-07-25 RX ORDER — OXYCODONE HYDROCHLORIDE 5 MG/1
5 TABLET ORAL EVERY 6 HOURS PRN
Qty: 12 TABLET | Refills: 0 | Status: SHIPPED | OUTPATIENT
Start: 2023-07-25 | End: 2023-07-28

## 2023-07-25 RX ORDER — NEOSTIGMINE METHYLSULFATE 1 MG/ML
VIAL (ML) INJECTION PRN
Status: DISCONTINUED | OUTPATIENT
Start: 2023-07-25 | End: 2023-07-25

## 2023-07-25 RX ORDER — BUPIVACAINE HYDROCHLORIDE 2.5 MG/ML
INJECTION, SOLUTION INFILTRATION; PERINEURAL PRN
Status: DISCONTINUED | OUTPATIENT
Start: 2023-07-25 | End: 2023-07-25 | Stop reason: HOSPADM

## 2023-07-25 RX ORDER — ONDANSETRON 2 MG/ML
INJECTION INTRAMUSCULAR; INTRAVENOUS PRN
Status: DISCONTINUED | OUTPATIENT
Start: 2023-07-25 | End: 2023-07-25

## 2023-07-25 RX ORDER — DEXMEDETOMIDINE HYDROCHLORIDE 4 UG/ML
INJECTION, SOLUTION INTRAVENOUS PRN
Status: DISCONTINUED | OUTPATIENT
Start: 2023-07-25 | End: 2023-07-25

## 2023-07-25 RX ORDER — HYDROMORPHONE HCL IN WATER/PF 6 MG/30 ML
0.2 PATIENT CONTROLLED ANALGESIA SYRINGE INTRAVENOUS EVERY 5 MIN PRN
Status: DISCONTINUED | OUTPATIENT
Start: 2023-07-25 | End: 2023-07-25 | Stop reason: HOSPADM

## 2023-07-25 RX ORDER — PROPOFOL 10 MG/ML
INJECTION, EMULSION INTRAVENOUS CONTINUOUS PRN
Status: DISCONTINUED | OUTPATIENT
Start: 2023-07-25 | End: 2023-07-25

## 2023-07-25 RX ORDER — ACETAMINOPHEN 325 MG/1
975 TABLET ORAL EVERY 6 HOURS
Status: DISCONTINUED | OUTPATIENT
Start: 2023-07-25 | End: 2023-07-26 | Stop reason: HOSPADM

## 2023-07-25 RX ORDER — SODIUM CHLORIDE 9 MG/ML
INJECTION, SOLUTION INTRAVENOUS CONTINUOUS
Status: DISCONTINUED | OUTPATIENT
Start: 2023-07-25 | End: 2023-07-26

## 2023-07-25 RX ORDER — LIDOCAINE HYDROCHLORIDE 20 MG/ML
INJECTION, SOLUTION INFILTRATION; PERINEURAL PRN
Status: DISCONTINUED | OUTPATIENT
Start: 2023-07-25 | End: 2023-07-25

## 2023-07-25 RX ORDER — FENTANYL CITRATE 0.05 MG/ML
50 INJECTION, SOLUTION INTRAMUSCULAR; INTRAVENOUS EVERY 5 MIN PRN
Status: DISCONTINUED | OUTPATIENT
Start: 2023-07-25 | End: 2023-07-25 | Stop reason: HOSPADM

## 2023-07-25 RX ORDER — POLYETHYLENE GLYCOL 3350 17 G/17G
17 POWDER, FOR SOLUTION ORAL 2 TIMES DAILY
Status: DISCONTINUED | OUTPATIENT
Start: 2023-07-25 | End: 2023-07-26 | Stop reason: HOSPADM

## 2023-07-25 RX ORDER — POLYETHYLENE GLYCOL 3350 17 G/17G
1 POWDER, FOR SOLUTION ORAL 2 TIMES DAILY PRN
Qty: 527 G | Refills: 0 | Status: SHIPPED | OUTPATIENT
Start: 2023-07-25 | End: 2023-09-08

## 2023-07-25 RX ORDER — CEFAZOLIN SODIUM/WATER 2 G/20 ML
2 SYRINGE (ML) INTRAVENOUS SEE ADMIN INSTRUCTIONS
Status: DISCONTINUED | OUTPATIENT
Start: 2023-07-25 | End: 2023-07-25 | Stop reason: HOSPADM

## 2023-07-25 RX ORDER — CIPROFLOXACIN 500 MG/1
500 TABLET, FILM COATED ORAL ONCE
Qty: 1 TABLET | Refills: 0 | Status: SHIPPED | OUTPATIENT
Start: 2023-07-25 | End: 2023-07-25

## 2023-07-25 RX ORDER — KETOROLAC TROMETHAMINE 15 MG/ML
15 INJECTION, SOLUTION INTRAMUSCULAR; INTRAVENOUS EVERY 6 HOURS PRN
Status: DISCONTINUED | OUTPATIENT
Start: 2023-07-25 | End: 2023-07-26 | Stop reason: HOSPADM

## 2023-07-25 RX ORDER — ONDANSETRON 2 MG/ML
4 INJECTION INTRAMUSCULAR; INTRAVENOUS EVERY 30 MIN PRN
Status: DISCONTINUED | OUTPATIENT
Start: 2023-07-25 | End: 2023-07-25 | Stop reason: HOSPADM

## 2023-07-25 RX ORDER — OXYCODONE HYDROCHLORIDE 5 MG/1
10 TABLET ORAL EVERY 4 HOURS PRN
Status: DISCONTINUED | OUTPATIENT
Start: 2023-07-25 | End: 2023-07-26 | Stop reason: HOSPADM

## 2023-07-25 RX ORDER — FENTANYL CITRATE 50 UG/ML
INJECTION, SOLUTION INTRAMUSCULAR; INTRAVENOUS PRN
Status: DISCONTINUED | OUTPATIENT
Start: 2023-07-25 | End: 2023-07-25

## 2023-07-25 RX ORDER — ONDANSETRON 4 MG/1
4 TABLET, ORALLY DISINTEGRATING ORAL EVERY 30 MIN PRN
Status: DISCONTINUED | OUTPATIENT
Start: 2023-07-25 | End: 2023-07-25 | Stop reason: HOSPADM

## 2023-07-25 RX ORDER — LIDOCAINE 40 MG/G
CREAM TOPICAL
Status: DISCONTINUED | OUTPATIENT
Start: 2023-07-25 | End: 2023-07-26 | Stop reason: HOSPADM

## 2023-07-25 RX ORDER — DEXAMETHASONE SODIUM PHOSPHATE 4 MG/ML
INJECTION, SOLUTION INTRA-ARTICULAR; INTRALESIONAL; INTRAMUSCULAR; INTRAVENOUS; SOFT TISSUE PRN
Status: DISCONTINUED | OUTPATIENT
Start: 2023-07-25 | End: 2023-07-25

## 2023-07-25 RX ORDER — HYDROMORPHONE HCL IN WATER/PF 6 MG/30 ML
0.4 PATIENT CONTROLLED ANALGESIA SYRINGE INTRAVENOUS EVERY 5 MIN PRN
Status: DISCONTINUED | OUTPATIENT
Start: 2023-07-25 | End: 2023-07-25 | Stop reason: HOSPADM

## 2023-07-25 RX ORDER — ONDANSETRON 2 MG/ML
4 INJECTION INTRAMUSCULAR; INTRAVENOUS EVERY 6 HOURS PRN
Status: DISCONTINUED | OUTPATIENT
Start: 2023-07-25 | End: 2023-07-26 | Stop reason: HOSPADM

## 2023-07-25 RX ADMIN — PHENYLEPHRINE HYDROCHLORIDE 100 MCG: 10 INJECTION INTRAVENOUS at 14:40

## 2023-07-25 RX ADMIN — PROPOFOL 30 MCG/KG/MIN: 10 INJECTION, EMULSION INTRAVENOUS at 13:20

## 2023-07-25 RX ADMIN — SODIUM CHLORIDE, PRESERVATIVE FREE: 5 INJECTION INTRAVENOUS at 20:31

## 2023-07-25 RX ADMIN — FENTANYL CITRATE 50 MCG: 50 INJECTION, SOLUTION INTRAMUSCULAR; INTRAVENOUS at 13:53

## 2023-07-25 RX ADMIN — ROCURONIUM BROMIDE 20 MG: 50 INJECTION, SOLUTION INTRAVENOUS at 15:32

## 2023-07-25 RX ADMIN — ACETAMINOPHEN 975 MG: 325 TABLET, FILM COATED ORAL at 20:45

## 2023-07-25 RX ADMIN — Medication 2 G: at 17:12

## 2023-07-25 RX ADMIN — PHENYLEPHRINE HYDROCHLORIDE 100 MCG: 10 INJECTION INTRAVENOUS at 13:38

## 2023-07-25 RX ADMIN — Medication 2 G: at 13:09

## 2023-07-25 RX ADMIN — PROPOFOL 200 MG: 10 INJECTION, EMULSION INTRAVENOUS at 13:16

## 2023-07-25 RX ADMIN — ONDANSETRON 4 MG: 2 INJECTION INTRAMUSCULAR; INTRAVENOUS at 13:26

## 2023-07-25 RX ADMIN — NEOSTIGMINE METHYLSULFATE 3.5 MG: 1 INJECTION, SOLUTION INTRAVENOUS at 17:29

## 2023-07-25 RX ADMIN — PHENYLEPHRINE HYDROCHLORIDE 100 MCG: 10 INJECTION INTRAVENOUS at 14:10

## 2023-07-25 RX ADMIN — ROCURONIUM BROMIDE 20 MG: 50 INJECTION, SOLUTION INTRAVENOUS at 13:45

## 2023-07-25 RX ADMIN — MIDAZOLAM 2 MG: 1 INJECTION INTRAMUSCULAR; INTRAVENOUS at 13:09

## 2023-07-25 RX ADMIN — PHENYLEPHRINE HYDROCHLORIDE 0.2 MCG/KG/MIN: 10 INJECTION INTRAVENOUS at 14:10

## 2023-07-25 RX ADMIN — FENTANYL CITRATE 25 MCG: 50 INJECTION, SOLUTION INTRAMUSCULAR; INTRAVENOUS at 18:24

## 2023-07-25 RX ADMIN — SODIUM CHLORIDE, POTASSIUM CHLORIDE, SODIUM LACTATE AND CALCIUM CHLORIDE: 600; 310; 30; 20 INJECTION, SOLUTION INTRAVENOUS at 13:09

## 2023-07-25 RX ADMIN — ROCURONIUM BROMIDE 20 MG: 50 INJECTION, SOLUTION INTRAVENOUS at 14:32

## 2023-07-25 RX ADMIN — LIDOCAINE HYDROCHLORIDE 100 MG: 20 INJECTION, SOLUTION INFILTRATION; PERINEURAL at 13:16

## 2023-07-25 RX ADMIN — GLYCOPYRROLATE 0.7 MG: 0.2 INJECTION, SOLUTION INTRAMUSCULAR; INTRAVENOUS at 17:29

## 2023-07-25 RX ADMIN — HYDROMORPHONE HYDROCHLORIDE 0.4 MG: 0.2 INJECTION, SOLUTION INTRAMUSCULAR; INTRAVENOUS; SUBCUTANEOUS at 22:25

## 2023-07-25 RX ADMIN — PHENYLEPHRINE HYDROCHLORIDE 100 MCG: 10 INJECTION INTRAVENOUS at 14:05

## 2023-07-25 RX ADMIN — FENTANYL CITRATE 25 MCG: 50 INJECTION, SOLUTION INTRAMUSCULAR; INTRAVENOUS at 18:30

## 2023-07-25 RX ADMIN — HYDROMORPHONE HYDROCHLORIDE 0.5 MG: 1 INJECTION, SOLUTION INTRAMUSCULAR; INTRAVENOUS; SUBCUTANEOUS at 16:52

## 2023-07-25 RX ADMIN — DEXMEDETOMIDINE HYDROCHLORIDE 12 MCG: 200 INJECTION INTRAVENOUS at 17:13

## 2023-07-25 RX ADMIN — ROCURONIUM BROMIDE 20 MG: 50 INJECTION, SOLUTION INTRAVENOUS at 16:16

## 2023-07-25 RX ADMIN — DEXAMETHASONE SODIUM PHOSPHATE 4 MG: 4 INJECTION, SOLUTION INTRA-ARTICULAR; INTRALESIONAL; INTRAMUSCULAR; INTRAVENOUS; SOFT TISSUE at 13:26

## 2023-07-25 RX ADMIN — SODIUM CHLORIDE, SODIUM LACTATE, POTASSIUM CHLORIDE, CALCIUM CHLORIDE: 600; 310; 30; 20 INJECTION, SOLUTION INTRAVENOUS at 15:49

## 2023-07-25 RX ADMIN — POLYETHYLENE GLYCOL 3350 17 G: 17 POWDER, FOR SOLUTION ORAL at 20:45

## 2023-07-25 RX ADMIN — ROCURONIUM BROMIDE 50 MG: 50 INJECTION, SOLUTION INTRAVENOUS at 13:16

## 2023-07-25 RX ADMIN — GLYCOPYRROLATE 0.2 MG: 0.2 INJECTION, SOLUTION INTRAMUSCULAR; INTRAVENOUS at 14:01

## 2023-07-25 RX ADMIN — SODIUM CHLORIDE, SODIUM LACTATE, POTASSIUM CHLORIDE, CALCIUM CHLORIDE: 600; 310; 30; 20 INJECTION, SOLUTION INTRAVENOUS at 13:26

## 2023-07-25 RX ADMIN — KETOROLAC TROMETHAMINE 15 MG: 15 INJECTION, SOLUTION INTRAMUSCULAR; INTRAVENOUS at 20:45

## 2023-07-25 RX ADMIN — FENTANYL CITRATE 50 MCG: 50 INJECTION, SOLUTION INTRAMUSCULAR; INTRAVENOUS at 13:16

## 2023-07-25 RX ADMIN — ROCURONIUM BROMIDE 10 MG: 50 INJECTION, SOLUTION INTRAVENOUS at 16:49

## 2023-07-25 RX ADMIN — PHENYLEPHRINE HYDROCHLORIDE 100 MCG: 10 INJECTION INTRAVENOUS at 15:13

## 2023-07-25 RX ADMIN — ONDANSETRON 4 MG: 2 INJECTION INTRAMUSCULAR; INTRAVENOUS at 17:16

## 2023-07-25 ASSESSMENT — ACTIVITIES OF DAILY LIVING (ADL)
ADLS_ACUITY_SCORE: 18
ADLS_ACUITY_SCORE: 35
ADLS_ACUITY_SCORE: 18

## 2023-07-25 NOTE — ANESTHESIA PROCEDURE NOTES
Airway       Patient location during procedure: OR       Procedure Start/Stop Times: 7/25/2023 1:19 PM  Staff -        Anesthesiologist:  Wolf Hensley MD       CRNA: Blanche Dave APRN CRNA       Performed By: CRNAIndications and Patient Condition       Indications for airway management: portillo-procedural       Induction type:intravenous       Mask difficulty assessment: 1 - vent by mask    Final Airway Details       Final airway type: endotracheal airway       Successful airway: ETT - single  Endotracheal Airway Details        ETT size (mm): 8.0       Cuffed: yes       Successful intubation technique: video laryngoscopy       VL Blade Size: Ortega 3       Grade View of Cords: 1       Adjucts: stylet       Position: Right       Measured from: gums/teeth       Secured at (cm): 23       Bite block used: None    Post intubation assessment        Placement verified by: capnometry, equal breath sounds and chest rise        Number of attempts at approach: 1       Secured with: pink tape       Ease of procedure: easy       Dentition: Intact and Unchanged    Medication(s) Administered   Medication Administration Time: 7/25/2023 1:19 PM

## 2023-07-25 NOTE — ANESTHESIA CARE TRANSFER NOTE
Patient: Amador Jose    Procedure: Procedure(s):  robotic assisted laparoscopic right pyeloplasty, right ureteral stent placement       Diagnosis: Obstruction of right ureteropelvic junction (UPJ) [N13.5]  Diagnosis Additional Information: No value filed.    Anesthesia Type:   General     Note:    Oropharynx: oropharynx clear of all foreign objects and spontaneously breathing  Level of Consciousness: awake  Oxygen Supplementation: face mask  Level of Supplemental Oxygen (L/min / FiO2): 6  Independent Airway: airway patency satisfactory and stable  Dentition: dentition unchanged  Vital Signs Stable: post-procedure vital signs reviewed and stable  Report to RN Given: handoff report given  Patient transferred to: PACU    Handoff Report: Identifed the Patient, Identified the Reponsible Provider, Reviewed the pertinent medical history, Discussed the surgical course, Reviewed Intra-OP anesthesia mangement and issues during anesthesia, Set expectations for post-procedure period and Allowed opportunity for questions and acknowledgement of understanding      Vitals:  Vitals Value Taken Time   BP     Temp     Pulse 79 07/25/23 1802   Resp 21 07/25/23 1802   SpO2 100 % 07/25/23 1802   Vitals shown include unvalidated device data.    Electronically Signed By: Kiersten Rawls  July 25, 2023  6:04 PM

## 2023-07-25 NOTE — ANESTHESIA PREPROCEDURE EVALUATION
Anesthesia Pre-Procedure Evaluation    Patient: Amador Jose   MRN: [id number] : 2000        Procedure : Procedure(s):  robotic assisted laparoscopic right pyeloplasty, right ureteral stent placement          No past medical history on file.   History reviewed. No pertinent surgical history.   Allergies   Allergen Reactions     Fluorescein-Benoxinate Other (See Comments)      Social History     Tobacco Use     Smoking status: Never     Smokeless tobacco: Never   Substance Use Topics     Alcohol use: Not on file      Wt Readings from Last 1 Encounters:   23 70.2 kg (154 lb 12.8 oz)        Anesthesia Evaluation   Pt has not had prior anesthetic         ROS/MED HX  ENT/Pulmonary:  - neg pulmonary ROS  (-) sleep apnea   Neurologic:  - neg neurologic ROS     Cardiovascular:  - neg cardiovascular ROS  (-) hypertension   METS/Exercise Tolerance:     Hematologic:       Musculoskeletal:       GI/Hepatic:    (-) GERD   Renal/Genitourinary: Comment: Elevated creatinine, hydronephrosis      Endo:  - neg endo ROS     Psychiatric/Substance Use:  - neg psychiatric ROS     Infectious Disease:       Malignancy:       Other:            Physical Exam    Airway        Mallampati: II   TM distance: > 3 FB   Neck ROM: full   Mouth opening: > 3 cm    Respiratory Devices and Support         Dental       (+) Completely normal teeth      Cardiovascular   cardiovascular exam normal          Pulmonary   pulmonary exam normal                OUTSIDE LABS:  CBC:   Lab Results   Component Value Date    WBC 12.9 (H) 2023    HGB 15.5 2023    HCT 43.4 2023     2023     BMP:   Lab Results   Component Value Date     (L) 2023    POTASSIUM 3.8 2023    CHLORIDE 98 2023    CO2 26 2023    BUN 14.3 2023    CR 1.48 (H) 2023    GLC 97 2023     COAGS: No results found for: PTT, INR, FIBR  POC: No results found for: BGM, HCG, HCGS  HEPATIC: No results found for:  ALBUMIN, PROTTOTAL, ALT, AST, GGT, ALKPHOS, BILITOTAL, BILIDIRECT, GABRIELA  OTHER:   Lab Results   Component Value Date    RANDI 8.9 07/07/2023       Anesthesia Plan    ASA Status:  2   NPO Status:  NPO Appropriate    Anesthesia Type: General.     - Airway: ETT   Induction: Intravenous, Propofol.   Maintenance: Balanced.        Consents    Anesthesia Plan(s) and associated risks, benefits, and realistic alternatives discussed. Questions answered and patient/representative(s) expressed understanding.    - Discussed:     - Discussed with:  Patient         Postoperative Care    Pain management: IV analgesics.   PONV prophylaxis: Ondansetron (or other 5HT-3), Background Propofol Infusion     Comments:                Wolf Hensley MD

## 2023-07-25 NOTE — BRIEF OP NOTE
[facility]    Brief Operative Note    Pre-operative diagnosis: Obstruction of right ureteropelvic junction (UPJ) [N13.5]  Post-operative diagnosis Same as pre-operative diagnosis    Procedure: Procedure(s):  robotic assisted laparoscopic right pyeloplasty, right ureteral stent placement  Surgeon: Surgeon(s) and Role:     * Neeraj Mckee MD - Primary  Anesthesia: General   Estimated Blood Loss: 20 mL    Drains: 6 Fr x 28 cm double J right ureteral stent; 16 Fr ba  Specimens:   ID Type Source Tests Collected by Time Destination   1 : Right Ureteropelvic Junction Tissue Ureter, Right SURGICAL PATHOLOGY EXAM Neeraj Mckee MD 7/25/2023  3:54 PM      Findings:   None.  Complications: None.  Implants:   Implant Name Type Inv. Item Serial No.  Lot No. LRB No. Used Action   STENT URETERAL POLARIS ULTRA [id number] - [id number] Stent STENT URETERAL POLARIS ULTRA [id number]  [employer] [id number] Right 1 Implanted     Plan  Urology obs  KUB in PACU  ADAT  Ba out in AM  Then will monitor drain output x 2 hrs  DUNCAN out if output stable  Likely home tmrw

## 2023-07-25 NOTE — INTERVAL H&P NOTE
I have reviewed the surgical (or preoperative) H&P that is linked to this encounter, and examined the patient. There are no significant changes    Clinical Conditions Present on Arrival:  Clinically Significant Risk Factors Present on Admission         # Hyponatremia: Lowest Na = 133 mmol/L in last 30 days, will monitor as appropriate

## 2023-07-26 VITALS
BODY MASS INDEX: 19.87 KG/M2 | HEIGHT: 74 IN | HEART RATE: 63 BPM | OXYGEN SATURATION: 95 % | SYSTOLIC BLOOD PRESSURE: 114 MMHG | DIASTOLIC BLOOD PRESSURE: 61 MMHG | WEIGHT: 154.8 LBS | RESPIRATION RATE: 16 BRPM | TEMPERATURE: 98.5 F

## 2023-07-26 LAB
ANION GAP SERPL CALCULATED.3IONS-SCNC: 13 MMOL/L (ref 7–15)
BASOPHILS # BLD AUTO: 0 10E3/UL (ref 0–0.2)
BASOPHILS NFR BLD AUTO: 0 %
BUN SERPL-MCNC: 12.6 MG/DL (ref 6–20)
CALCIUM SERPL-MCNC: 8.2 MG/DL (ref 8.6–10)
CHLORIDE SERPL-SCNC: 103 MMOL/L (ref 98–107)
CREAT SERPL-MCNC: 1.06 MG/DL (ref 0.67–1.17)
DEPRECATED HCO3 PLAS-SCNC: 23 MMOL/L (ref 22–29)
EOSINOPHIL # BLD AUTO: 0 10E3/UL (ref 0–0.7)
EOSINOPHIL NFR BLD AUTO: 0 %
ERYTHROCYTE [DISTWIDTH] IN BLOOD BY AUTOMATED COUNT: 11.8 % (ref 10–15)
GFR SERPL CREATININE-BSD FRML MDRD: >90 ML/MIN/1.73M2
GLUCOSE BLDC GLUCOMTR-MCNC: 84 MG/DL (ref 70–99)
GLUCOSE SERPL-MCNC: 89 MG/DL (ref 70–99)
HCT VFR BLD AUTO: 39.5 % (ref 40–53)
HGB BLD-MCNC: 14.1 G/DL (ref 13.3–17.7)
IMM GRANULOCYTES # BLD: 0 10E3/UL
IMM GRANULOCYTES NFR BLD: 0 %
LYMPHOCYTES # BLD AUTO: 1.3 10E3/UL (ref 0.8–5.3)
LYMPHOCYTES NFR BLD AUTO: 10 %
MCH RBC QN AUTO: 32.6 PG (ref 26.5–33)
MCHC RBC AUTO-ENTMCNC: 35.7 G/DL (ref 31.5–36.5)
MCV RBC AUTO: 91 FL (ref 78–100)
MONOCYTES # BLD AUTO: 1.1 10E3/UL (ref 0–1.3)
MONOCYTES NFR BLD AUTO: 8 %
NEUTROPHILS # BLD AUTO: 10.6 10E3/UL (ref 1.6–8.3)
NEUTROPHILS NFR BLD AUTO: 82 %
NRBC # BLD AUTO: 0 10E3/UL
NRBC BLD AUTO-RTO: 0 /100
PLATELET # BLD AUTO: 268 10E3/UL (ref 150–450)
POTASSIUM SERPL-SCNC: 3.8 MMOL/L (ref 3.4–5.3)
RBC # BLD AUTO: 4.32 10E6/UL (ref 4.4–5.9)
SODIUM SERPL-SCNC: 139 MMOL/L (ref 136–145)
WBC # BLD AUTO: 13 10E3/UL (ref 4–11)

## 2023-07-26 PROCEDURE — 250N000011 HC RX IP 250 OP 636: Mod: JZ | Performed by: STUDENT IN AN ORGANIZED HEALTH CARE EDUCATION/TRAINING PROGRAM

## 2023-07-26 PROCEDURE — 250N000013 HC RX MED GY IP 250 OP 250 PS 637: Performed by: STUDENT IN AN ORGANIZED HEALTH CARE EDUCATION/TRAINING PROGRAM

## 2023-07-26 PROCEDURE — 258N000003 HC RX IP 258 OP 636: Performed by: STUDENT IN AN ORGANIZED HEALTH CARE EDUCATION/TRAINING PROGRAM

## 2023-07-26 PROCEDURE — 85025 COMPLETE CBC W/AUTO DIFF WBC: CPT | Performed by: STUDENT IN AN ORGANIZED HEALTH CARE EDUCATION/TRAINING PROGRAM

## 2023-07-26 PROCEDURE — 80048 BASIC METABOLIC PNL TOTAL CA: CPT | Performed by: STUDENT IN AN ORGANIZED HEALTH CARE EDUCATION/TRAINING PROGRAM

## 2023-07-26 PROCEDURE — 36415 COLL VENOUS BLD VENIPUNCTURE: CPT | Performed by: STUDENT IN AN ORGANIZED HEALTH CARE EDUCATION/TRAINING PROGRAM

## 2023-07-26 PROCEDURE — 82962 GLUCOSE BLOOD TEST: CPT

## 2023-07-26 RX ORDER — CIPROFLOXACIN 500 MG/1
500 TABLET, FILM COATED ORAL ONCE
Qty: 1 TABLET | Refills: 0 | Status: SHIPPED | OUTPATIENT
Start: 2023-07-26 | End: 2023-07-26

## 2023-07-26 RX ADMIN — CEFAZOLIN SODIUM 2 G: 2 INJECTION, SOLUTION INTRAVENOUS at 08:07

## 2023-07-26 RX ADMIN — POLYETHYLENE GLYCOL 3350 17 G: 17 POWDER, FOR SOLUTION ORAL at 08:07

## 2023-07-26 RX ADMIN — SODIUM CHLORIDE, PRESERVATIVE FREE: 5 INJECTION INTRAVENOUS at 07:26

## 2023-07-26 RX ADMIN — CEFAZOLIN SODIUM 2 G: 2 INJECTION, SOLUTION INTRAVENOUS at 01:09

## 2023-07-26 RX ADMIN — ACETAMINOPHEN 975 MG: 325 TABLET, FILM COATED ORAL at 08:07

## 2023-07-26 RX ADMIN — ACETAMINOPHEN 975 MG: 325 TABLET, FILM COATED ORAL at 02:56

## 2023-07-26 RX ADMIN — OXYCODONE HYDROCHLORIDE 10 MG: 5 TABLET ORAL at 03:17

## 2023-07-26 ASSESSMENT — ACTIVITIES OF DAILY LIVING (ADL)
ADLS_ACUITY_SCORE: 25
ADLS_ACUITY_SCORE: 18
ADLS_ACUITY_SCORE: 25

## 2023-07-26 NOTE — PHARMACY-ADMISSION MEDICATION HISTORY
Pharmacist Admission Medication History    Admission medication history is complete. The information provided in this note is only as accurate as the sources available at the time of the update.    Medication reconciliation/reorder completed by provider prior to medication history? No    Information Source(s): Patient and CareEverywhere/SureScripts via phone    Pertinent Information: None    Changes made to PTA medication list:  Added: None  Deleted: None  Changed: None    Medication Affordability:  Not including over the counter (OTC) medications, was there a time in the past 3 months when you did not take your medications as prescribed because of cost?: No    Allergies reviewed with patient and updates made in EHR: no    Medication History Completed By: Zuri Sharp RPH 7/25/2023 8:21 PM    Prior to Admission medications    Medication Sig Last Dose Taking? Auth Provider Long Term End Date

## 2023-07-26 NOTE — OP NOTE
[facility]  Operative Note    Pre-operative diagnosis: Obstruction of right ureteropelvic junction (UPJ) [N13.5]   Post-operative diagnosis Obstruction of right ureteropelvic junction (UPJ) [N13.5]   Procedure: Procedure(s):  robotic assisted laparoscopic right pyeloplasty, right ureteral stent placement   Surgeon: Neeraj Mckee MD   Assistants(s): Keisha Maldonado MD - assisting   Anesthesia: General    Estimated blood loss: 20 ml    Total IV fluids: (See anesthesia record)   Blood transfusion: No transfusion was given during surgery   Total urine output: (See anesthesia record)   Drains: Wills catheter  Right 6 fr x 26 cm double J ureteral stent  Right lower qudrant 19 Fr Scott drain to bulb suction   Specimens: ID Type Source Tests Collected by Time Destination   1 : Right Ureteropelvic Junction Tissue Ureter, Right SURGICAL PATHOLOGY EXAM Neeraj Mckee MD 7/25/2023  3:54 PM         Implants: Implant Name Type Inv. Item Serial No.  Lot No. LRB No. 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   Condition: Stable               Description of procedure:  23-year-old man with history of intermittent right flank pain, found to have right hydronephrosis with concern for ureteropelvic junction obstruction.  A nuclear medicine renal scan with Lasix was performed which showed 45%/55% R/L split renal function and no significant emptying of the right kidney, findings consistent with UPJ obstruction.  CT urogram showed a lower pole crossing vessel which appeared to be the cause of the UPJ obstruction.  I discussed dismembered  pyeloplasty for treatment.  Risks including development of ureteral stricture and need for repeat surgical management, urine leak, damage to surrounding structures including bowel, bleeding, pain, infection were discussed.  Informed consent was obtained.    The patient was brought to robot operating room #1.  Ancef antibiotics were given prior to the procedure.  General anesthesia was induced, a Wills catheter was placed and left to gravity drainage, he was shaved and then repositioned in right side up modified flank position with all pressure points padded.  He was then prepped and draped in standard sterile fashion.  A timeout was performed.    Landmarks including the anterior superior iliac spine, the 11th rib, the costal margin, the xiphoid process, and the umbilicus were noted.  Initial entry to the abdomen was performed at about the level of the 11th rib at a point approximating the lateral border of the rectus muscle.  A skin incision was made with a #15 blade and a Veress needle was used to obtain CO2 pneumoperitoneum.  An 8 mm robotic port was placed and the da Mann laparoscope was introduced into the abdomen.  Three additional robotic ports and a 12 mm air seal assistant port were then placed: A right hand robotic port about a handsbreadth cephalad to the camera port near the costal margin, and 2 left hand robotic ports and the air seal port placed forming a geovany with the camera port to the right and the assistant port at the bottom of the geovany (medial, near the umbilicus).  The da Mann Xi robot was then docked.  Dissection proceeded using a ProGrasp and bipolar [place] in the left hand robotic ports and a monopolar curved scissors in the right hand port.  The colon was taken down starting at the white line of Toldt and reflected medially.  A 5 mm port was placed near the xiphoid process and used to place a locking Allis grasper to help retract the liver.  The liver was dissected off of the upper  pole of the right kidney.  The duodenum was identified and reflected medially (Kocher maneuver) revealing the inferior vena cava.  A very prominent gonadal vein was seen running along the lateral border of the vena cava.  A psoas window was created inferior to the lower pole of the kidney and the right ureter was identified.  The ureter was then dissected cephalad towards the hilum and the renal pelvis.  A lower pole accessory renal artery was identified as a crossing vessel which appeared to be causing obstruction at the level of the ureteropelvic junction.  The renal pelvis was noted to be quite dilated and redundant.  The renal pelvis and ureter were mobilized liberally to ensure a tension free anastomosis.  The lower pole crossing vessel and the left renal vein were skeletonized.  The right ureter was then transected at the level of the ureteropelvic junction.  The ureter was then widely spatulated laterally and the renal pelvis was widely spatulated medially.  A small amount of the redundant renal pelvis was trimmed.  The renal pelvis was then transposed anterior to the crossing vessel.  4-0 Vicryl suture was then used to reapproximate the ureter to the renal pelvis.  A single interrupted suture was placed at the apex of the ureteral spatulation.  Running 4-0 Vicryl suture was then used to complete the back wall of the anastomosis.  A stent was then placed in an antegrade fashion down the ureter with the assistance of a sensor wire.  The wire was removed and the proximal curl of the stent was passed into the renal pelvis.  The anterior wall of the anastomosis was then completed using running Vicryl suture.  At the end of the procedure the anastomosis appeared to be watertight and without any significant tension. Perinephric fat was then reapproximated over the anastomosis with Vicryl. A Scott drain was placed via the lateral robotic port and left near the anastomosis; the drain was secured to the skin with a  drain stitch. The liver retractor was removed and the robot was then undocked. A Abisai Bebe device was used to close the 12 mm assist port fascia with a 0 vicryl suture. All ports were removed. Local anesthestic was infiltrated into the incisions and final skin closure was performed with 4-0 monocryl subcuticular suture. Surgical glue was used for final dressing. A drain dressing was applied and the drain was placed to bulb suction. Patient was cleaned up, put back into supine position, awoken from anesthesia and brought to PACU in stable condition.    Neeraj Mckee MD   [facility]  [phone] clinic phone

## 2023-07-26 NOTE — ANESTHESIA POSTPROCEDURE EVALUATION
Patient: Amador Jose    Procedure: Procedure(s):  robotic assisted laparoscopic right pyeloplasty, right ureteral stent placement       Anesthesia Type:  General    Note:  Disposition: Inpatient   Postop Pain Control: Uneventful            Sign Out: Well controlled pain   PONV: No   Neuro/Psych: Uneventful            Sign Out: Acceptable/Baseline neuro status   Airway/Respiratory: Uneventful            Sign Out: Acceptable/Baseline resp. status   CV/Hemodynamics: Uneventful            Sign Out: Acceptable CV status   Other NRE: NONE   DID A NON-ROUTINE EVENT OCCUR? No           Last vitals:  Vitals Value Taken Time   /66 07/25/23 1930   Temp 37.1  C (98.7  F) 07/25/23 1845   Pulse 80 07/25/23 1930   Resp 20 07/25/23 1930   SpO2 99 % 07/25/23 1930   Vitals shown include unvalidated device data.    Electronically Signed By: Chuck Morris MD  July 25, 2023  9:36 PM  
[place]

## 2023-07-26 NOTE — PLAN OF CARE
Date & Time: 2030 - 0700.  Surgery/POD#: POD 1 from a Robotic- assisted Lap R pyeloplasty w/ stent placement.  Behavior & Aggression: Green - no concerns.  Fall Risk: Yes.  Orientation: A&Ox4, solment/ sleepy when got to floor - improved/ more alert in am.  ABNL VS/O2: VSS on RA. Denied N/V.  ABNL Labs: see chart.  Pain Management: Scheduled Tylenol, PRN Iv Dilaudid, Toradol, & ice packs.  Bowel/Bladder: Contient, hypoactive bowel sounds, no BM, passing little to no gas per pt.  IV/Drains/incisions: PIV infusing NS @ 100ml/hr. DUNCAN in place w/ bloody/red OP. Wills w/ tea colored UOP - possibly remove later today. Abdominal incisions are C/D/I.  Diet: Regular.  Activity Level: Ax1, dangled at bedside.  Tests/Procedures: N/A.  Anticipated  DC Date: Possibly today.

## 2023-07-26 NOTE — PROGRESS NOTES
"Urology  Progress Note    No acute events overnight  Tolerating diet  Not yet OOB  Pain controlled with meds    Exam  /61 (BP Location: Right arm)   Pulse 63   Temp 98.5  F (36.9  C) (Oral)   Resp 16   Ht 1.88 m (6' 2\")   Wt 70.2 kg (154 lb 12.8 oz)   SpO2 95%   BMI 19.88 kg/m    No acute distress  Unlabored breathing  Abdomen soft, nontender, nondistended. Incisions c/d/I with dermabond  DUNCAN serosanguinous  Wills removed    UOP 2500/500  DUNCAN 50/15    Labs  WBC 13  Hgb 14  Cr 1.06    Assessment/Plan  23 year old y/o male POD#1 s/p right robotic pyeloplasty/stent placement for congenital UPJ obstruction. Recovering as anticipated postop. Wills out    - monitor DUNCAN output for the next 2 hours. If stable from overnight, DUNCAN can be removed  - continue current regimen for pain control  - ensure spontaneous voiding and discharge today  - follow up in 6 weeks for stent removal      Seen and examined. Will discuss with Dr. Mckee.    Keisha Maldonado MD, PGY-5  Urology Resident     Contacting the Urology Team     Please use the following job codes to reach the Urology Team. Note that you must use an in house phone and that job codes cannot receive text pages.   On weekdays, dial [phone] (or star-star-star [phone] on the new [employer] telephones) then [phone] to reach the Adult Urology resident or PA on call  On weekdays, dial [phone] (or star-star-star [phone] on the new [employer] telephones) then [phone] to reach the Pediatric Urology resident  On weeknights and weekends, dial [phone] (or star-star-star [phone] on the new [employer] telephones) then [phone] to reach the Urology resident on call (for both Adult and Pediatrics)            "

## 2023-08-03 LAB
PATH REPORT.COMMENTS IMP SPEC: NORMAL
PATH REPORT.FINAL DX SPEC: NORMAL
PATH REPORT.GROSS SPEC: NORMAL
PATH REPORT.MICROSCOPIC SPEC OTHER STN: NORMAL
PATH REPORT.RELEVANT HX SPEC: NORMAL
PHOTO IMAGE: NORMAL

## 2023-09-08 ENCOUNTER — OFFICE VISIT (OUTPATIENT)
Dept: UROLOGY | Facility: CLINIC | Age: 23
End: 2023-09-08
Payer: COMMERCIAL

## 2023-09-08 VITALS
WEIGHT: 154 LBS | SYSTOLIC BLOOD PRESSURE: 138 MMHG | HEIGHT: 74 IN | BODY MASS INDEX: 19.76 KG/M2 | DIASTOLIC BLOOD PRESSURE: 82 MMHG

## 2023-09-08 DIAGNOSIS — N13.5 OBSTRUCTION OF RIGHT URETEROPELVIC JUNCTION (UPJ): Primary | ICD-10-CM

## 2023-09-08 PROCEDURE — 52310 CYSTOSCOPY AND TREATMENT: CPT | Mod: 78 | Performed by: STUDENT IN AN ORGANIZED HEALTH CARE EDUCATION/TRAINING PROGRAM

## 2023-09-08 RX ORDER — LIDOCAINE HYDROCHLORIDE 20 MG/ML
JELLY TOPICAL ONCE
Status: COMPLETED | OUTPATIENT
Start: 2023-09-08 | End: 2023-09-08

## 2023-09-08 RX ADMIN — LIDOCAINE HYDROCHLORIDE: 20 JELLY TOPICAL at 14:36

## 2023-09-08 ASSESSMENT — PAIN SCALES - GENERAL: PAINLEVEL: NO PAIN (1)

## 2023-09-08 NOTE — LETTER
9/8/2023       RE: Amador Jose  [address]  [place] [address]     Dear Colleague,    Thank you for referring your patient, Amador Jose, to the [facility] [place] at [facility]. Please see a copy of my visit note below.    PRE-PROCEDURE DIAGNOSIS: right UPJO    POST-PROCEDURE DIAGNOSIS: right UPJO    PROCEDURE: Cystoscopy with right ureteral stent removal    HISTORY: 23 year old male with right UPJO s/p right robotic pyeloplasty 7/25/2023 (dismembered pyeloplasty with crossing vessel), here for stent removal    REVIEW OF OFFICE STUDIES (e.g., uroflow or PVR): none    DESCRIPTION OF PROCEDURE: After informed consent was obtained, the patient was brought to the procedure room where he was placed in the supine position with all pressure points well padded.  The penis was prepped and draped in sterile fashion. A flexible cystoscope was introduced through a well-lubricated urethra. The stent was visualized, grasped with a flexible grasper and removed intact and discarded    The flexible cystoscope was removed and the findings were described to the patient.     ASSESSMENT AND PLAN: 23 year old male with right UPJO s/p right robotic pyeloplasty 7/25/2023 (dismembered pyeloplasty with crossing vessel)    - follow up in 2 months from now with NM renal scan and renal ultrasound, BMP prior    Neeraj Mckee MD   [facility] Urology  [phone] clinic phone

## 2023-09-08 NOTE — PATIENT INSTRUCTIONS
"AFTER YOUR CYSTOSCOPY AND STENT REMOVAL  ?  ?  You have just completed a cystoscopy, or \"cysto\", which allowed your physician to learn more about your bladder (or to remove a stent placed after surgery). We suggest that you continue to avoid caffeine, fruit juice, and alcohol for the next 24 hours, however, you are encouraged to return to your normal activities.  ?  ?  A few things that are considered normal after your cystoscopy:  ?  * small amount of bleeding (or spotting) that clears within the next 24 hours  ?  * slight burning sensation with urination  ?  * sensation of needing to void (urinate) more frequently  ?  * the feeling of \"air\" in your urine  ?  * mild discomfort that is relieved with Tylenol    * bladder spasms  ?  ?  ?  Please contact our office promptly if you:  ?  * develop a fever above 101 degrees  ?  * are unable to urinate  ?  * develop bright red blood that does not stop  ?  * experience severe pain or swelling  ?  ?  ?  And of course, please contact our office with any concerns or questions [phone].  ?    "

## 2023-09-08 NOTE — PROGRESS NOTES
PRE-PROCEDURE DIAGNOSIS: right UPJO    POST-PROCEDURE DIAGNOSIS: right UPJO    PROCEDURE: Cystoscopy with right ureteral stent removal    HISTORY: 23 year old male with right UPJO s/p right robotic pyeloplasty 7/25/2023 (dismembered pyeloplasty with crossing vessel), here for stent removal    REVIEW OF OFFICE STUDIES (e.g., uroflow or PVR): none    DESCRIPTION OF PROCEDURE: After informed consent was obtained, the patient was brought to the procedure room where he was placed in the supine position with all pressure points well padded.  The penis was prepped and draped in sterile fashion. A flexible cystoscope was introduced through a well-lubricated urethra. The stent was visualized, grasped with a flexible grasper and removed intact and discarded    The flexible cystoscope was removed and the findings were described to the patient.     ASSESSMENT AND PLAN: 23 year old male with right UPJO s/p right robotic pyeloplasty 7/25/2023 (dismembered pyeloplasty with crossing vessel)    - follow up in 2 months from now with NM renal scan and renal ultrasound, BMP prior    Neeraj Mckee MD   [facility] Urology  [phone] clinic phone

## 2023-09-08 NOTE — NURSING NOTE
Chief Complaint   Patient presents with    congenital UPJ obstruction     Stent removal     Prior to the start of the procedure and with procedural staff participation, I verbally confirmed the patient s identity using two indicators, relevant allergies, that the procedure was appropriate and matched the consent or emergent situation, and that the correct equipment/implants were available. Immediately prior to starting the procedure I conducted the Time Out with the procedural staff and re-confirmed the patient s name, procedure, and site/side. I have wiped the patient off with the povidone-Iodine solution, draped them, and used Lidocaine hydrochloride jelly. (The Joint Commission universal protocol was followed.)  Yes    Sedation (Moderate or Deep): None    5mL 2% lidocaine hydrochloride Urojet instilled into urethra.    NDC# [address]-3  Lot #: [id number]  Expiration Date:  7/24    Elvira Marinelli, EMT

## 2023-10-27 ENCOUNTER — LAB (OUTPATIENT)
Dept: LAB | Facility: CLINIC | Age: 23
End: 2023-10-27
Attending: STUDENT IN AN ORGANIZED HEALTH CARE EDUCATION/TRAINING PROGRAM
Payer: COMMERCIAL

## 2023-10-27 ENCOUNTER — HOSPITAL ENCOUNTER (OUTPATIENT)
Dept: NUCLEAR MEDICINE | Facility: CLINIC | Age: 23
Setting detail: NUCLEAR MEDICINE
Discharge: HOME | End: 2023-10-27
Attending: STUDENT IN AN ORGANIZED HEALTH CARE EDUCATION/TRAINING PROGRAM | Admitting: STUDENT IN AN ORGANIZED HEALTH CARE EDUCATION/TRAINING PROGRAM
Payer: COMMERCIAL

## 2023-10-27 ENCOUNTER — HOSPITAL ENCOUNTER (OUTPATIENT)
Dept: ULTRASOUND IMAGING | Facility: CLINIC | Age: 23
Discharge: HOME | End: 2023-10-27
Attending: STUDENT IN AN ORGANIZED HEALTH CARE EDUCATION/TRAINING PROGRAM | Admitting: STUDENT IN AN ORGANIZED HEALTH CARE EDUCATION/TRAINING PROGRAM
Payer: COMMERCIAL

## 2023-10-27 DIAGNOSIS — N13.5 OBSTRUCTION OF RIGHT URETEROPELVIC JUNCTION (UPJ): ICD-10-CM

## 2023-10-27 DIAGNOSIS — Q62.11 HYDRONEPHROSIS WITH URETEROPELVIC JUNCTION (UPJ) OBSTRUCTION: ICD-10-CM

## 2023-10-27 LAB
ANION GAP SERPL CALCULATED.3IONS-SCNC: 12 MMOL/L (ref 7–15)
BUN SERPL-MCNC: 12.6 MG/DL (ref 6–20)
CALCIUM SERPL-MCNC: 9.6 MG/DL (ref 8.6–10)
CHLORIDE SERPL-SCNC: 101 MMOL/L (ref 98–107)
CREAT SERPL-MCNC: 1.05 MG/DL (ref 0.67–1.17)
DEPRECATED HCO3 PLAS-SCNC: 28 MMOL/L (ref 22–29)
EGFRCR SERPLBLD CKD-EPI 2021: >90 ML/MIN/1.73M2
GLUCOSE SERPL-MCNC: 88 MG/DL (ref 70–99)
POTASSIUM SERPL-SCNC: 4.4 MMOL/L (ref 3.4–5.3)
SODIUM SERPL-SCNC: 141 MMOL/L (ref 135–145)

## 2023-10-27 PROCEDURE — 343N000001 HC RX 343: Performed by: STUDENT IN AN ORGANIZED HEALTH CARE EDUCATION/TRAINING PROGRAM

## 2023-10-27 PROCEDURE — 80048 BASIC METABOLIC PNL TOTAL CA: CPT

## 2023-10-27 PROCEDURE — A9562 TC99M MERTIATIDE: HCPCS | Performed by: STUDENT IN AN ORGANIZED HEALTH CARE EDUCATION/TRAINING PROGRAM

## 2023-10-27 PROCEDURE — 78708 K FLOW/FUNCT IMAGE W/DRUG: CPT

## 2023-10-27 PROCEDURE — 76770 US EXAM ABDO BACK WALL COMP: CPT

## 2023-10-27 PROCEDURE — 36415 COLL VENOUS BLD VENIPUNCTURE: CPT

## 2023-10-27 RX ORDER — FUROSEMIDE 10 MG/ML
40 INJECTION INTRAMUSCULAR; INTRAVENOUS ONCE
Status: COMPLETED | OUTPATIENT
Start: 2023-10-27 | End: 2023-10-27

## 2023-10-27 RX ADMIN — FUROSEMIDE 40 MG: 10 INJECTION INTRAMUSCULAR; INTRAVENOUS at 14:35

## 2023-10-27 RX ADMIN — TECHNESCAN TC 99M MERTIATIDE 8.2 MILLICURIE: 1 INJECTION, POWDER, LYOPHILIZED, FOR SOLUTION INTRAVENOUS at 14:10

## 2023-11-02 ENCOUNTER — VIRTUAL VISIT (OUTPATIENT)
Dept: UROLOGY | Facility: CLINIC | Age: 23
End: 2023-11-02
Payer: COMMERCIAL

## 2023-11-02 DIAGNOSIS — N13.5 OBSTRUCTION OF RIGHT URETEROPELVIC JUNCTION (UPJ): Primary | ICD-10-CM

## 2023-11-02 PROCEDURE — 99213 OFFICE O/P EST LOW 20 MIN: CPT | Mod: VID | Performed by: STUDENT IN AN ORGANIZED HEALTH CARE EDUCATION/TRAINING PROGRAM

## 2023-11-02 ASSESSMENT — PAIN SCALES - GENERAL: PAINLEVEL: NO PAIN (0)

## 2023-11-02 NOTE — PROGRESS NOTES
CHIEF COMPLAINT   Amador Jose who is a 23 year old male returns today for follow-up of right UPJO right robotic pyeloplasty 7/25/2023 (dismembered pyeloplasty with crossing vessel)     HPI   Amador Jose is a 23 year old male returns today for follow-up of right UPJO right robotic pyeloplasty 7/25/2023 (dismembered pyeloplasty with crossing vessel)     He denies any ongoing symptoms of obstruction and feels well.     PHYSICAL EXAM  Patient is a 23 year old  male   General Appearance Adult:   Alert, no acute distress, oriented  HENT: throat/mouth:normal, good dentition  Lungs: no respiratory distress, or pursed lip breathing  Heart: No obvious jugular venous distension present  Musculoskeltal: extremities normal, no peripheral edema  Skin: no suspicious lesions or rashes  Neuro: Alert, oriented, speech and mentation normal  Psych: affect and mood normal    All pertinent imaging reviewed and interpreted personally:    NM renal scan 10/27/2023    Right kidney empties well after lasix administration    Renal ultrasound 10/27/2023      Mild right renal dilation    Component      Latest Ref Rng 10/27/2023  11:33 AM   Sodium      135 - 145 mmol/L 141    Potassium      3.4 - 5.3 mmol/L 4.4    Chloride      98 - 107 mmol/L 101    Carbon Dioxide (CO2)      22 - 29 mmol/L 28    Anion Gap      7 - 15 mmol/L 12    Urea Nitrogen      6.0 - 20.0 mg/dL 12.6    Creatinine      0.67 - 1.17 mg/dL 1.05    GFR Estimate      >60 mL/min/1.73m2 >90    Calcium      8.6 - 10.0 mg/dL 9.6    Glucose      70 - 99 mg/dL 88          ASSESSMENT and PLAN  23 year old male returns today for follow-up of right UPJO right robotic pyeloplasty 7/25/2023 (dismembered pyeloplasty with crossing vessel)     Doing well, imaging without obstruction. Normal renal function. Some residual right sided dilation as expected. asymptomatic    Return 9 months with renal ultrasound, NM renal scan, BMP prior    Neeraj Mckee MD   [facility] Urology  [facility]  [facility] Phone: [phone]        Virtual Visit Details    Type of service:  Video Visit   Video Start Time: 8:01am  Video End Time:8:08am    Originating Location (pt. Location): Home    Distant Location (provider location):  On-site  Platform used for Video Visit: Hemal

## 2023-11-02 NOTE — NURSING NOTE
Is the patient currently in the state of [place]? YES    Visit mode:VIDEO    If the visit is dropped, the patient can be reconnected by: VIDEO VISIT: Text to cell phone:   Telephone Information:   Mobile [phone]       Will anyone else be joining the visit? NO  (If patient encounters technical issues they should call [phone]    How would you like to obtain your AVS? MyChart    Are changes needed to the allergy or medication list? Pt stated no changes to allergies and Pt stated no med changes    Pt unsure of new pharmacy at check-in. Pt stated they will figure it out and let us know    Reason for visit: RECHECK (2 month follow-up )    Laura GONZALEZ

## 2023-11-02 NOTE — LETTER
11/2/2023       RE: Amador Jose  [address] [facility] [address]     Dear Colleague,    Thank you for referring your patient, Amador Jose, to the [facility]OLOG[facility] [place] at [facility]. Please see a copy of my visit note below.    CHIEF COMPLAINT   Amador Jose who is a 23 year old male returns today for follow-up of right UPJO right robotic pyeloplasty 7/25/2023 (dismembered pyeloplasty with crossing vessel)     HPI   Amador Jose is a 23 year old male returns today for follow-up of right UPJO right robotic pyeloplasty 7/25/2023 (dismembered pyeloplasty with crossing vessel)     He denies any ongoing symptoms of obstruction and feels well.     PHYSICAL EXAM  Patient is a 23 year old  male   General Appearance Adult:   Alert, no acute distress, oriented  HENT: throat/mouth:normal, good dentition  Lungs: no respiratory distress, or pursed lip breathing  Heart: No obvious jugular venous distension present  Musculoskeltal: extremities normal, no peripheral edema  Skin: no suspicious lesions or rashes  Neuro: Alert, oriented, speech and mentation normal  Psych: affect and mood normal    All pertinent imaging reviewed and interpreted personally:    NM renal scan 10/27/2023    Right kidney empties well after lasix administration    Renal ultrasound 10/27/2023      Mild right renal dilation    Component      Latest Ref Rng 10/27/2023  11:33 AM   Sodium      135 - 145 mmol/L 141    Potassium      3.4 - 5.3 mmol/L 4.4    Chloride      98 - 107 mmol/L 101    Carbon Dioxide (CO2)      22 - 29 mmol/L 28    Anion Gap      7 - 15 mmol/L 12    Urea Nitrogen      6.0 - 20.0 mg/dL 12.6    Creatinine      0.67 - 1.17 mg/dL 1.05    GFR Estimate      >60 mL/min/1.73m2 >90    Calcium      8.6 - 10.0 mg/dL 9.6    Glucose      70 - 99 mg/dL 88          ASSESSMENT and PLAN  23 year old male returns today for follow-up of right UPJO right robotic pyeloplasty  7/25/2023 (dismembered pyeloplasty with crossing vessel)     Doing well, imaging without obstruction. Normal renal function. Some residual right sided dilation as expected. asymptomatic    Return 9 months with renal ultrasound, NM renal scan, BMP prior    Neeraj Mckee MD   [facility] Urology  [facility] Phone: [phone]        Virtual Visit Details    Type of service:  Video Visit   Video Start Time: 8:01am  Video End Time:8:08am    Originating Location (pt. Location): Home    Distant Location (provider location):  On-site  Platform used for Video Visit: Hemal

## 2023-12-31 ENCOUNTER — HEALTH MAINTENANCE LETTER (OUTPATIENT)
Age: 23
End: 2023-12-31

## 2024-05-02 DIAGNOSIS — N13.5 OBSTRUCTION OF RIGHT URETEROPELVIC JUNCTION (UPJ): Primary | ICD-10-CM

## 2024-05-21 ENCOUNTER — ANESTHESIA (OUTPATIENT)
Dept: SURGERY | Facility: CLINIC | Age: 24
End: 2024-05-21
Payer: COMMERCIAL

## 2024-05-21 ENCOUNTER — HOSPITAL ENCOUNTER (OUTPATIENT)
Facility: CLINIC | Age: 24
Discharge: HOME | End: 2024-05-21
Attending: EMERGENCY MEDICINE | Admitting: EMERGENCY MEDICINE
Payer: COMMERCIAL

## 2024-05-21 ENCOUNTER — APPOINTMENT (OUTPATIENT)
Dept: CT IMAGING | Facility: CLINIC | Age: 24
End: 2024-05-21
Attending: EMERGENCY MEDICINE
Payer: COMMERCIAL

## 2024-05-21 ENCOUNTER — ANESTHESIA EVENT (OUTPATIENT)
Dept: SURGERY | Facility: CLINIC | Age: 24
End: 2024-05-21
Payer: COMMERCIAL

## 2024-05-21 VITALS
HEART RATE: 84 BPM | RESPIRATION RATE: 16 BRPM | TEMPERATURE: 97.9 F | HEIGHT: 74 IN | BODY MASS INDEX: 21.17 KG/M2 | OXYGEN SATURATION: 100 % | SYSTOLIC BLOOD PRESSURE: 128 MMHG | DIASTOLIC BLOOD PRESSURE: 72 MMHG | WEIGHT: 165 LBS

## 2024-05-21 DIAGNOSIS — K35.80 ACUTE APPENDICITIS, UNSPECIFIED ACUTE APPENDICITIS TYPE: ICD-10-CM

## 2024-05-21 LAB
ALBUMIN SERPL BCG-MCNC: 5.2 G/DL (ref 3.5–5.2)
ALBUMIN UR-MCNC: 10 MG/DL
ALP SERPL-CCNC: 61 U/L (ref 40–150)
ALT SERPL W P-5'-P-CCNC: 24 U/L (ref 0–70)
ANION GAP SERPL CALCULATED.3IONS-SCNC: 16 MMOL/L (ref 7–15)
APPEARANCE UR: CLEAR
AST SERPL W P-5'-P-CCNC: 23 U/L (ref 0–45)
BASOPHILS # BLD AUTO: 0.1 10E3/UL (ref 0–0.2)
BASOPHILS NFR BLD AUTO: 0 %
BILIRUB SERPL-MCNC: 1.3 MG/DL
BILIRUB UR QL STRIP: NEGATIVE
BUN SERPL-MCNC: 11.6 MG/DL (ref 6–20)
CALCIUM SERPL-MCNC: 9.7 MG/DL (ref 8.6–10)
CHLORIDE SERPL-SCNC: 96 MMOL/L (ref 98–107)
COLOR UR AUTO: ABNORMAL
CREAT SERPL-MCNC: 0.95 MG/DL (ref 0.67–1.17)
DEPRECATED HCO3 PLAS-SCNC: 26 MMOL/L (ref 22–29)
EGFRCR SERPLBLD CKD-EPI 2021: >90 ML/MIN/1.73M2
EOSINOPHIL # BLD AUTO: 0 10E3/UL (ref 0–0.7)
EOSINOPHIL NFR BLD AUTO: 0 %
ERYTHROCYTE [DISTWIDTH] IN BLOOD BY AUTOMATED COUNT: 11.7 % (ref 10–15)
GLUCOSE SERPL-MCNC: 117 MG/DL (ref 70–99)
GLUCOSE UR STRIP-MCNC: NEGATIVE MG/DL
HCT VFR BLD AUTO: 46.8 % (ref 40–53)
HGB BLD-MCNC: 17.1 G/DL (ref 13.3–17.7)
HGB UR QL STRIP: ABNORMAL
HOLD SPECIMEN: NORMAL
IMM GRANULOCYTES # BLD: 0.1 10E3/UL
IMM GRANULOCYTES NFR BLD: 1 %
KETONES UR STRIP-MCNC: NEGATIVE MG/DL
LEUKOCYTE ESTERASE UR QL STRIP: NEGATIVE
LIPASE SERPL-CCNC: 20 U/L (ref 13–60)
LYMPHOCYTES # BLD AUTO: 1.4 10E3/UL (ref 0.8–5.3)
LYMPHOCYTES NFR BLD AUTO: 6 %
MCH RBC QN AUTO: 33.1 PG (ref 26.5–33)
MCHC RBC AUTO-ENTMCNC: 36.5 G/DL (ref 31.5–36.5)
MCV RBC AUTO: 91 FL (ref 78–100)
MONOCYTES # BLD AUTO: 1.3 10E3/UL (ref 0–1.3)
MONOCYTES NFR BLD AUTO: 6 %
MUCOUS THREADS #/AREA URNS LPF: PRESENT /LPF
NEUTROPHILS # BLD AUTO: 18.5 10E3/UL (ref 1.6–8.3)
NEUTROPHILS NFR BLD AUTO: 87 %
NITRATE UR QL: NEGATIVE
NRBC # BLD AUTO: 0 10E3/UL
NRBC BLD AUTO-RTO: 0 /100
PH UR STRIP: 6.5 [PH] (ref 5–7)
PLATELET # BLD AUTO: 350 10E3/UL (ref 150–450)
POTASSIUM SERPL-SCNC: 3.6 MMOL/L (ref 3.4–5.3)
PROT SERPL-MCNC: 7.5 G/DL (ref 6.4–8.3)
RBC # BLD AUTO: 5.17 10E6/UL (ref 4.4–5.9)
RBC URINE: 161 /HPF
SODIUM SERPL-SCNC: 138 MMOL/L (ref 135–145)
SP GR UR STRIP: 1 (ref 1–1.03)
UROBILINOGEN UR STRIP-MCNC: NORMAL MG/DL
WBC # BLD AUTO: 21.4 10E3/UL (ref 4–11)
WBC URINE: 1 /HPF

## 2024-05-21 PROCEDURE — 250N000013 HC RX MED GY IP 250 OP 250 PS 637

## 2024-05-21 PROCEDURE — 96375 TX/PRO/DX INJ NEW DRUG ADDON: CPT | Mod: 59 | Performed by: EMERGENCY MEDICINE

## 2024-05-21 PROCEDURE — 44970 LAPAROSCOPY APPENDECTOMY: CPT | Performed by: ANESTHESIOLOGY

## 2024-05-21 PROCEDURE — 999N000141 HC STATISTIC PRE-PROCEDURE NURSING ASSESSMENT: Performed by: SURGERY

## 2024-05-21 PROCEDURE — 44970 LAPAROSCOPY APPENDECTOMY: CPT | Mod: GC | Performed by: SURGERY

## 2024-05-21 PROCEDURE — 258N000003 HC RX IP 258 OP 636: Performed by: EMERGENCY MEDICINE

## 2024-05-21 PROCEDURE — 99285 EMERGENCY DEPT VISIT HI MDM: CPT | Performed by: EMERGENCY MEDICINE

## 2024-05-21 PROCEDURE — 96361 HYDRATE IV INFUSION ADD-ON: CPT | Performed by: EMERGENCY MEDICINE

## 2024-05-21 PROCEDURE — 83690 ASSAY OF LIPASE: CPT | Performed by: EMERGENCY MEDICINE

## 2024-05-21 PROCEDURE — 250N000009 HC RX 250: Performed by: SURGERY

## 2024-05-21 PROCEDURE — 99140 ANES COMP EMERGENCY COND: CPT | Performed by: NURSE ANESTHETIST, CERTIFIED REGISTERED

## 2024-05-21 PROCEDURE — 88304 TISSUE EXAM BY PATHOLOGIST: CPT | Mod: TC | Performed by: SURGERY

## 2024-05-21 PROCEDURE — 74177 CT ABD & PELVIS W/CONTRAST: CPT | Mod: 26 | Performed by: RADIOLOGY

## 2024-05-21 PROCEDURE — 250N000009 HC RX 250: Performed by: ANESTHESIOLOGY

## 2024-05-21 PROCEDURE — 360N000076 HC SURGERY LEVEL 3, PER MIN: Performed by: SURGERY

## 2024-05-21 PROCEDURE — 99140 ANES COMP EMERGENCY COND: CPT | Performed by: ANESTHESIOLOGY

## 2024-05-21 PROCEDURE — 250N000009 HC RX 250: Performed by: EMERGENCY MEDICINE

## 2024-05-21 PROCEDURE — 250N000011 HC RX IP 250 OP 636: Mod: JZ | Performed by: EMERGENCY MEDICINE

## 2024-05-21 PROCEDURE — 96367 TX/PROPH/DG ADDL SEQ IV INF: CPT | Mod: 59 | Performed by: EMERGENCY MEDICINE

## 2024-05-21 PROCEDURE — 710N000012 HC RECOVERY PHASE 2, PER MINUTE: Performed by: SURGERY

## 2024-05-21 PROCEDURE — 85004 AUTOMATED DIFF WBC COUNT: CPT | Performed by: EMERGENCY MEDICINE

## 2024-05-21 PROCEDURE — 258N000003 HC RX IP 258 OP 636: Performed by: ANESTHESIOLOGY

## 2024-05-21 PROCEDURE — 44970 LAPAROSCOPY APPENDECTOMY: CPT | Performed by: NURSE ANESTHETIST, CERTIFIED REGISTERED

## 2024-05-21 PROCEDURE — 272N000001 HC OR GENERAL SUPPLY STERILE: Performed by: SURGERY

## 2024-05-21 PROCEDURE — 80053 COMPREHEN METABOLIC PANEL: CPT | Performed by: EMERGENCY MEDICINE

## 2024-05-21 PROCEDURE — 74177 CT ABD & PELVIS W/CONTRAST: CPT

## 2024-05-21 PROCEDURE — 370N000017 HC ANESTHESIA TECHNICAL FEE, PER MIN: Performed by: SURGERY

## 2024-05-21 PROCEDURE — 99285 EMERGENCY DEPT VISIT HI MDM: CPT | Mod: 25 | Performed by: EMERGENCY MEDICINE

## 2024-05-21 PROCEDURE — 81003 URINALYSIS AUTO W/O SCOPE: CPT | Performed by: EMERGENCY MEDICINE

## 2024-05-21 PROCEDURE — 250N000011 HC RX IP 250 OP 636: Performed by: EMERGENCY MEDICINE

## 2024-05-21 PROCEDURE — 88304 TISSUE EXAM BY PATHOLOGIST: CPT | Mod: 26 | Performed by: PATHOLOGY

## 2024-05-21 PROCEDURE — 710N000010 HC RECOVERY PHASE 1, LEVEL 2, PER MIN: Performed by: SURGERY

## 2024-05-21 PROCEDURE — 250N000011 HC RX IP 250 OP 636: Performed by: ANESTHESIOLOGY

## 2024-05-21 PROCEDURE — 96365 THER/PROPH/DIAG IV INF INIT: CPT | Mod: 59 | Performed by: EMERGENCY MEDICINE

## 2024-05-21 PROCEDURE — 250N000011 HC RX IP 250 OP 636

## 2024-05-21 PROCEDURE — 250N000025 HC SEVOFLURANE, PER MIN: Performed by: SURGERY

## 2024-05-21 PROCEDURE — 36415 COLL VENOUS BLD VENIPUNCTURE: CPT | Performed by: EMERGENCY MEDICINE

## 2024-05-21 RX ORDER — ONDANSETRON 2 MG/ML
4 INJECTION INTRAMUSCULAR; INTRAVENOUS EVERY 30 MIN PRN
Status: DISCONTINUED | OUTPATIENT
Start: 2024-05-21 | End: 2024-05-21 | Stop reason: HOSPADM

## 2024-05-21 RX ORDER — CEFTRIAXONE 2 G/1
2 INJECTION, POWDER, FOR SOLUTION INTRAMUSCULAR; INTRAVENOUS ONCE
Status: COMPLETED | OUTPATIENT
Start: 2024-05-21 | End: 2024-05-21

## 2024-05-21 RX ORDER — DEXAMETHASONE SODIUM PHOSPHATE 4 MG/ML
INJECTION, SOLUTION INTRA-ARTICULAR; INTRALESIONAL; INTRAMUSCULAR; INTRAVENOUS; SOFT TISSUE PRN
Status: DISCONTINUED | OUTPATIENT
Start: 2024-05-21 | End: 2024-05-21

## 2024-05-21 RX ORDER — LIDOCAINE HYDROCHLORIDE 20 MG/ML
INJECTION, SOLUTION INFILTRATION; PERINEURAL PRN
Status: DISCONTINUED | OUTPATIENT
Start: 2024-05-21 | End: 2024-05-21

## 2024-05-21 RX ORDER — NALOXONE HYDROCHLORIDE 0.4 MG/ML
0.1 INJECTION, SOLUTION INTRAMUSCULAR; INTRAVENOUS; SUBCUTANEOUS
Status: DISCONTINUED | OUTPATIENT
Start: 2024-05-21 | End: 2024-05-21 | Stop reason: HOSPADM

## 2024-05-21 RX ORDER — ACETAMINOPHEN 325 MG/1
975 TABLET ORAL ONCE
Status: COMPLETED | OUTPATIENT
Start: 2024-05-21 | End: 2024-05-21

## 2024-05-21 RX ORDER — PROPOFOL 10 MG/ML
INJECTION, EMULSION INTRAVENOUS PRN
Status: DISCONTINUED | OUTPATIENT
Start: 2024-05-21 | End: 2024-05-21

## 2024-05-21 RX ORDER — HYDROMORPHONE HCL IN WATER/PF 6 MG/30 ML
0.4 PATIENT CONTROLLED ANALGESIA SYRINGE INTRAVENOUS EVERY 5 MIN PRN
Status: DISCONTINUED | OUTPATIENT
Start: 2024-05-21 | End: 2024-05-21 | Stop reason: HOSPADM

## 2024-05-21 RX ORDER — IOPAMIDOL 755 MG/ML
101 INJECTION, SOLUTION INTRAVASCULAR ONCE
Status: COMPLETED | OUTPATIENT
Start: 2024-05-21 | End: 2024-05-21

## 2024-05-21 RX ORDER — SODIUM CHLORIDE, SODIUM LACTATE, POTASSIUM CHLORIDE, CALCIUM CHLORIDE 600; 310; 30; 20 MG/100ML; MG/100ML; MG/100ML; MG/100ML
INJECTION, SOLUTION INTRAVENOUS CONTINUOUS
Status: DISCONTINUED | OUTPATIENT
Start: 2024-05-21 | End: 2024-05-21 | Stop reason: HOSPADM

## 2024-05-21 RX ORDER — HEPARIN SODIUM 5000 [USP'U]/.5ML
5000 INJECTION, SOLUTION INTRAVENOUS; SUBCUTANEOUS
Status: COMPLETED | OUTPATIENT
Start: 2024-05-21 | End: 2024-05-21

## 2024-05-21 RX ORDER — METRONIDAZOLE 500 MG/100ML
500 INJECTION, SOLUTION INTRAVENOUS EVERY 8 HOURS
Status: DISCONTINUED | OUTPATIENT
Start: 2024-05-21 | End: 2024-05-21 | Stop reason: HOSPADM

## 2024-05-21 RX ORDER — FENTANYL CITRATE 50 UG/ML
INJECTION, SOLUTION INTRAMUSCULAR; INTRAVENOUS PRN
Status: DISCONTINUED | OUTPATIENT
Start: 2024-05-21 | End: 2024-05-21

## 2024-05-21 RX ORDER — SODIUM CHLORIDE, SODIUM LACTATE, POTASSIUM CHLORIDE, CALCIUM CHLORIDE 600; 310; 30; 20 MG/100ML; MG/100ML; MG/100ML; MG/100ML
INJECTION, SOLUTION INTRAVENOUS CONTINUOUS PRN
Status: DISCONTINUED | OUTPATIENT
Start: 2024-05-21 | End: 2024-05-21

## 2024-05-21 RX ORDER — OXYCODONE HYDROCHLORIDE 5 MG/1
5 TABLET ORAL EVERY 6 HOURS PRN
Qty: 6 TABLET | Refills: 0 | Status: SHIPPED | OUTPATIENT
Start: 2024-05-21

## 2024-05-21 RX ORDER — FENTANYL CITRATE 50 UG/ML
50 INJECTION, SOLUTION INTRAMUSCULAR; INTRAVENOUS EVERY 5 MIN PRN
Status: DISCONTINUED | OUTPATIENT
Start: 2024-05-21 | End: 2024-05-21 | Stop reason: HOSPADM

## 2024-05-21 RX ORDER — ONDANSETRON 2 MG/ML
INJECTION INTRAMUSCULAR; INTRAVENOUS PRN
Status: DISCONTINUED | OUTPATIENT
Start: 2024-05-21 | End: 2024-05-21

## 2024-05-21 RX ORDER — ONDANSETRON 4 MG/1
4 TABLET, ORALLY DISINTEGRATING ORAL EVERY 30 MIN PRN
Status: DISCONTINUED | OUTPATIENT
Start: 2024-05-21 | End: 2024-05-21 | Stop reason: HOSPADM

## 2024-05-21 RX ORDER — OXYCODONE HYDROCHLORIDE 10 MG/1
10 TABLET ORAL
Status: DISCONTINUED | OUTPATIENT
Start: 2024-05-21 | End: 2024-05-21 | Stop reason: HOSPADM

## 2024-05-21 RX ORDER — SODIUM CHLORIDE 9 MG/ML
INJECTION, SOLUTION INTRAVENOUS CONTINUOUS
Status: DISCONTINUED | OUTPATIENT
Start: 2024-05-21 | End: 2024-05-21 | Stop reason: HOSPADM

## 2024-05-21 RX ORDER — KETAMINE HYDROCHLORIDE 10 MG/ML
INJECTION INTRAMUSCULAR; INTRAVENOUS PRN
Status: DISCONTINUED | OUTPATIENT
Start: 2024-05-21 | End: 2024-05-21

## 2024-05-21 RX ADMIN — ONDANSETRON 4 MG: 2 INJECTION INTRAMUSCULAR; INTRAVENOUS at 14:46

## 2024-05-21 RX ADMIN — MIDAZOLAM 2 MG: 1 INJECTION INTRAMUSCULAR; INTRAVENOUS at 13:17

## 2024-05-21 RX ADMIN — HYDROMORPHONE HYDROCHLORIDE 0.5 MG: 1 INJECTION, SOLUTION INTRAMUSCULAR; INTRAVENOUS; SUBCUTANEOUS at 14:42

## 2024-05-21 RX ADMIN — ONDANSETRON 4 MG: 2 INJECTION INTRAMUSCULAR; INTRAVENOUS at 03:12

## 2024-05-21 RX ADMIN — METRONIDAZOLE 500 MG: 500 INJECTION, SOLUTION INTRAVENOUS at 07:03

## 2024-05-21 RX ADMIN — SODIUM CHLORIDE: 9 INJECTION, SOLUTION INTRAVENOUS at 06:20

## 2024-05-21 RX ADMIN — SUGAMMADEX 200 MG: 100 INJECTION, SOLUTION INTRAVENOUS at 14:51

## 2024-05-21 RX ADMIN — LIDOCAINE HYDROCHLORIDE 70 MG: 20 INJECTION, SOLUTION INFILTRATION; PERINEURAL at 13:26

## 2024-05-21 RX ADMIN — IOPAMIDOL 101 ML: 755 INJECTION, SOLUTION INTRAVENOUS at 04:57

## 2024-05-21 RX ADMIN — Medication 70 MG: at 13:26

## 2024-05-21 RX ADMIN — PROPOFOL 150 MG: 10 INJECTION, EMULSION INTRAVENOUS at 13:25

## 2024-05-21 RX ADMIN — ACETAMINOPHEN 975 MG: 325 TABLET, FILM COATED ORAL at 12:47

## 2024-05-21 RX ADMIN — Medication 50 MG: at 13:25

## 2024-05-21 RX ADMIN — LIDOCAINE HYDROCHLORIDE 100 MG: 20 INJECTION, SOLUTION INFILTRATION; PERINEURAL at 13:25

## 2024-05-21 RX ADMIN — SODIUM CHLORIDE, POTASSIUM CHLORIDE, SODIUM LACTATE AND CALCIUM CHLORIDE: 600; 310; 30; 20 INJECTION, SOLUTION INTRAVENOUS at 13:18

## 2024-05-21 RX ADMIN — CEFTRIAXONE SODIUM 2 G: 2 INJECTION, POWDER, FOR SOLUTION INTRAMUSCULAR; INTRAVENOUS at 06:26

## 2024-05-21 RX ADMIN — FENTANYL CITRATE 50 MCG: 50 INJECTION INTRAMUSCULAR; INTRAVENOUS at 13:57

## 2024-05-21 RX ADMIN — DEXAMETHASONE SODIUM PHOSPHATE 6 MG: 4 INJECTION, SOLUTION INTRA-ARTICULAR; INTRALESIONAL; INTRAMUSCULAR; INTRAVENOUS; SOFT TISSUE at 13:26

## 2024-05-21 RX ADMIN — SODIUM CHLORIDE 1000 ML: 9 INJECTION, SOLUTION INTRAVENOUS at 03:12

## 2024-05-21 RX ADMIN — HEPARIN SODIUM 5000 UNITS: 5000 INJECTION, SOLUTION INTRAVENOUS; SUBCUTANEOUS at 12:48

## 2024-05-21 RX ADMIN — FENTANYL CITRATE 50 MCG: 50 INJECTION INTRAMUSCULAR; INTRAVENOUS at 13:48

## 2024-05-21 RX ADMIN — METRONIDAZOLE 500 MG: 500 INJECTION, SOLUTION INTRAVENOUS at 13:49

## 2024-05-21 RX ADMIN — SODIUM CHLORIDE, PRESERVATIVE FREE 75 ML: 5 INJECTION INTRAVENOUS at 04:56

## 2024-05-21 RX ADMIN — Medication 20 MG: at 13:49

## 2024-05-21 ASSESSMENT — COLUMBIA-SUICIDE SEVERITY RATING SCALE - C-SSRS
6. HAVE YOU EVER DONE ANYTHING, STARTED TO DO ANYTHING, OR PREPARED TO DO ANYTHING TO END YOUR LIFE?: NO
2. HAVE YOU ACTUALLY HAD ANY THOUGHTS OF KILLING YOURSELF IN THE PAST MONTH?: NO
1. IN THE PAST MONTH, HAVE YOU WISHED YOU WERE DEAD OR WISHED YOU COULD GO TO SLEEP AND NOT WAKE UP?: NO

## 2024-05-21 ASSESSMENT — ACTIVITIES OF DAILY LIVING (ADL)
ADLS_ACUITY_SCORE: 36
ADLS_ACUITY_SCORE: 35
ADLS_ACUITY_SCORE: 35
ADLS_ACUITY_SCORE: 36
ADLS_ACUITY_SCORE: 36
ADLS_ACUITY_SCORE: 35
ADLS_ACUITY_SCORE: 36
ADLS_ACUITY_SCORE: 34
ADLS_ACUITY_SCORE: 36

## 2024-05-21 NOTE — ED TRIAGE NOTES
Patient presents with diffuse abdominal pain and nausea for the past 12 hours. Has been taking Tylenol and Ibuprofen with no relief. Last dose of Ibuprofen was at 0230, and Tylenol was at 2230.       Triage Assessment (Adult)       Row Name 05/21/24 0258          Triage Assessment    Airway WDL WDL        Respiratory WDL    Respiratory WDL WDL        Skin Circulation/Temperature WDL    Skin Circulation/Temperature WDL WDL        Cardiac WDL    Cardiac WDL X     Cardiac Rhythm ST         Peripheral/Neurovascular WDL    Peripheral Neurovascular WDL WDL        Cognitive/Neuro/Behavioral WDL    Cognitive/Neuro/Behavioral WDL WDL        [place] Coma Scale    Best Eye Response 4-->(E4) spontaneous     Best Motor Response 6-->(M6) obeys commands     Best Verbal Response 5-->(V5) oriented     [facility] Coma Scale Score 15

## 2024-05-21 NOTE — DISCHARGE SUMMARY
[facility]  Discharge Summary  General Surgery     Amador Jose MRN# [id number]   YOB: 2000 Age: 24 year old     Date of Admission:  5/21/2024  Date of Discharge::  5/21/2024  Admitting Physician:  No admitting provider for patient encounter.  Discharge Physician:  Bronwyn Brothers   Primary Care Physician:        Luis Alfredo Underwood          Admission Diagnoses:   Acute appendicitis, unspecified acute appendicitis type [K35.80]          Discharge Diagnosis:   Acute appendicitis, unspecified acute appendicitis type [K35.80]         Procedures:   Procedure(s):  Laparoscopic appendectomy          Consultations:   None          Brief History of Illness:   Patient is a 24 year old male with a history of congential UPJ obstruction s/p robotic pyeloplasty who presented to the ED with abdominal pain. Afebrile, WBC was 21.4. CT scan revealed acute appendicitis with appendiceal dilatation and multiple luminal appendicoliths.            Hospital Course:   The patient underwent laparoscopic appendectomy on 5/21/24, which he tolerated well without immediate complications. He was transferred to the PACU and then subsequently discharged. Patient with follow up with their primary care provider and a nurse from general surgery will contact the patient.          Imaging Studies:     Results for orders placed or performed during the hospital encounter of 05/21/24   CT Abdomen Pelvis w Contrast    Narrative    EXAM: CT ABDOMEN PELVIS W CONTRAST  LOCATION: [facility]  DATE: 5/21/2024    INDICATION: Periumbilical abdominal pain, leukocytosis, nausea  and  vomiting  COMPARISON: 7/18/2023  TECHNIQUE: CT scan of the abdomen and pelvis was performed following injection of IV contrast. Multiplanar reformats were obtained. Dose reduction techniques were used.  CONTRAST: 101ml isovue 370    FINDINGS:   LOWER CHEST: Normal.    HEPATOBILIARY: Normal.    PANCREAS:  Normal.    SPLEEN: Normal.    ADRENAL GLANDS: Stable thickening left adrenal gland, no further follow-up. Right adrenal gland negative.    KIDNEYS/BLADDER: Resolution of prior right-sided hydronephrosis. Mild cortical scarring lateral right kidney unchanged. Symmetric renal enhancement.    BOWEL: Acute appendicitis with appendiceal dilatation at 1.2 cm with associated wall thickening and mild surrounding inflammatory change. Multiple luminal appendicoliths. No perforation or abscess formation. No bowel obstruction or inflammatory change   elsewhere.    LYMPH NODES: No lymphadenopathy.    VASCULATURE: Normal.    PELVIC ORGANS: Minimal free fluid.    MUSCULOSKELETAL: Normal.      Impression    IMPRESSION:   1.  Acute appendicitis with appendiceal dilatation, wall thickening and mild surrounding inflammatory change. Multiple luminal appendicoliths.    2.  No perforation or abscess formation. Minimal free fluid in the pelvis.                             Medications Prior to Admission:     No medications prior to admission.          Discharge Medications:     Current Discharge Medication List        START taking these medications    Details   oxyCODONE (ROXICODONE) 5 MG tablet Take 1 tablet (5 mg) by mouth every 6 hours as needed for pain  Qty: 6 tablet, Refills: 0    Associated Diagnoses: Acute appendicitis, unspecified acute appendicitis type                  Day of Discharge Physical Exam:   Temp:  [97.1  F (36.2  C)-98.5  F (36.9  C)] 97.1  F (36.2  C)  Pulse:  [] 80  Resp:  [15-20] 18  BP: (114-136)/(69-88) 123/74  SpO2:  [98 %-100 %] 99 %    General: awake, alert, no acute distress, laying comfortably in bed   CV: warm, well perfused   Pulm: breathing comfortably on room air   Abdomen: soft, non-distended, appropriately tender  Incision c/d/I    Extremities: no edema, moving all extremities spontaneously            Final Pathology Result:   Pending         Discharge Instructions and Follow-Up:     Discharge  Procedure Orders   When to call - Contact Surgeon Team   Order Comments: You may experience symptoms that require follow-up before your scheduled appointment. Contact your Surgeon Team if you are concerned about pain control, large amount of bleeding, blood clots, constipation, or if you experience signs of infection (fever, growing tenderness at the surgery site, a large amount of drainage, severe pain, foul-smelling drainage, redness or swelling.     When to call - Reach out to Urgent Care   Order Comments: If you are experiencing uncontrolled Nausea and Vomiting, uncontrolled pain, inability to urinate and uncomfortable, and in need of immediate care, and you are NOT able to reach your Surgeon Team, go to an Urgent Care clinic. Do NOT go to the Emergency Room unless you have shortness of breath, chest pain, or other signs of a medical emergency.     When to call - Reasons to Call 911   Order Comments: Call 911 immediately if you experience sudden-onset chest pain, arm weakness/numbness, slurred speech, or shortness of breath     Symptoms - Fever Management   Order Comments: A low grade fever can be expected after surgery. Your Provider many have prescribed an Opioid pain medication that also contains acetaminophen (TYLENOL) that may help with Fever management.  Do NOT take additional acetaminophen (TYLENOL) in combination with an Opioid/acetaminophen (TYLENOL) product. Read the labels on your Over The Counter (OTC) medications with care.     Symptoms - Reduced Urine Output   Order Comments: If it has been greater than 8 hours since you have urinated despite drinking plenty of water, call your Surgeon Team.     No driving or operating machinery   Order Comments: Do NOT drive any vehicle or operate mechanical equipment for 24 hours following the end of your surgery.  Even though you may feel normal, your reactions may be affected by Anesthesia medication you received.     No Alcohol   Order Comments: Do NOT drink  alcoholic beverages for 24 hours following your surgery and while taking pain medications.     Diet Instructions   Order Comments: Follow your surgeon's orders for any diet restrictions.  If you did not receive any diet restrictions, you may drink clear liquids (apple juice, ginger ale, 7-up, broth, etc.), and progress to your regular diet as you feel able. It is important to stay well-hydrated after surgery and drink plenty of water.     Discharge Instructions - Comfort and Pain Management   Order Comments: Pain after surgery is normal and expected. You will have some amount of pain after surgery. Your pain will improve with time. There are several things you can do to help reduce your pain including: rest, ice, and using pain medications as needed. Use pain interventions and don't wait until pain level is out of control. Contact your Surgeon Team if you have pain that persists or worsens after surgery despite rest, ice, and taking your medication(s) as prescribed. You may have a dry mouth, a sore throat, muscles aches or trouble sleeping, and these symptoms should go away after 24 hours.     Discharge Instructions - Rest   Order Comments: Rest and relax for the next 24 hours. Make arrangements to have someone stay with you overnight, and avoid hazardous and strenuous activities.  Do NOT make any important decisions for the next 24 hours.     Return to normal activity as tolerated   Order Comments: Return to normal activity as tolerated     Order Specific Question Answer Comments   Is discharge order? Yes      Brief Discharge Instructions   Order Comments: Discharge Instructions  Activity: Normal activity as tolerated     Incisions  Do not submerge in a tub or a pool for 4 weeks.   You may shower.   Your incision is closed with absorbable suture and covered with steri strips and bandages. These will fall off in about a week.     Medications  - Do not take any additional Tylenol (acetaminophen) while using a  narcotic pain medication which includes acetaminophen  - Do not take more than 4,000mg of acetaminophen in any 24 hour period, as this can cause liver damage  - Take stool softeners such as Senna or Miralax while you are using narcotics, but stop if you develop diarrhea  - Wean yourself off of narcotic pain medications    Follow-Up:  - Call your primary care provider to touch base regarding your recent admission.    - Call or return sooner than your regularly scheduled visit if you develop any of the following: fever >101.5, uncontrolled pain, uncontrolled nausea or vomiting, as well as increased redness, swelling, or drainage from your wound.   -  A nurse from the General Surgery Clinic will contact you 24 hours, or next business day, after your discharge from the hospital.  If you have questions or to schedule a follow up appointment please call the General Surgery Clinic at [phone].  Call [phone] and ask to speak with the Surgery resident on call if you are having troubles in the evenings, at night, or on the weekend.  -  If you are receiving home care please inform your home care nurse of our contact number.              Home Health Care:     Not needed           Discharge Disposition:     Discharged to home      Condition at discharge: Stable    Patient was seen, examined, and discussed on day of discharge with chief resident, who discussed with staff surgeon.    Martine Dey, DO  General Surgery, PGY-1

## 2024-05-21 NOTE — CONSULTS
General Surgery Consult Note  05/21/2024    Reason for consult: Appendicitis  Consult requested by: [facility]      ASSESSMENT: 24M with hx congenital UPJ obstruction who presents to the ED with periumbilical abdominal pain that began yesterday afternoon, found to have acute uncomplicated appendicitis with multiple appendicoliths. Leukocytosis to 21.4, mildly elevated Tbili to 1.3. UA clean. Hx of robotic pyeloplasty. Patient appears to be a good risk candidate for laparoscopic vs open appendectomy.    RECOMMENDATIONS:    - NPO, mIVF, IV antibiotics  - Added on for laparoscopic vs open appendectomy today, consented      Discussed with chief resident Dr. Whitfield and staff Dr. Zev Gonzales MD  Surgery Resident      =======================    History of Present Illness:    Amador Jose is a 24 year old male with hx congenital UPJ obstruction s/p robotic pyeloplasty. He presents with abdominal pain around his umbilicus which began around 4pm yesterday. He has had associated nausea and 1 episode of vomiting since the pain began. He denies any fevers, chills, or burning with urination. Pain is currently managed with pain regimen. 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    Past Medical History:  No past medical history on file.    Past Surgical History  Past Surgical History:   Procedure Laterality Date    DAVINCI PYELOPLASTY Right 7/25/2023    Procedure: robotic assisted laparoscopic right pyeloplasty, right ureteral stent placement;  Surgeon: Neeraj Mckee MD;  Location:  OR       Family History:  No family history on file.    Social History:  Social History     Social History Narrative    Not on file        Medications:  No current outpatient medications on file.       Allergies:  Allergies   Allergen Reactions    Fluorescein-Benoxinate Other (See Comments)       Review of Symptoms:  A 10 point review of symptoms has been conducted and is negative except for that  mentioned in the above HPI.    Physical Exam:    Temp:  [97.6  F (36.4  C)] 97.6  F (36.4  C)  Pulse:  [113] 113  Resp:  [16] 16  BP: (136)/(88) 136/88  SpO2:  [99 %] 99 %    Gen: NAD, resting comfortably  HEENT: NCAT, sclera anicteric  CV: RRR by radial pulse  Resp: nonlabored respirations, comfortable on room air  Abd: soft, tender in the RLQ with voluntary guarding, not peritonitic, nondistended   Ext: WWP w/o edema  Neuro: no focal deficits  Skin: No rashes    Labs:  CBC RESULTS:   Recent Labs   Lab Test 05/21/24  0306   WBC 21.4*   RBC 5.17   HGB 17.1   HCT 46.8   MCV 91   MCH 33.1*   MCHC 36.5   RDW 11.7        Last Comprehensive Metabolic Panel:  Lab Results   Component Value Date     05/21/2024    POTASSIUM 3.6 05/21/2024    CHLORIDE 96 (L) 05/21/2024    CO2 26 05/21/2024    ANIONGAP 16 (H) 05/21/2024     (H) 05/21/2024    BUN 11.6 05/21/2024    CR 0.95 05/21/2024    GFRESTIMATED >90 05/21/2024    RANDI 9.7 05/21/2024       Liver Function Studies -   Recent Labs   Lab Test 05/21/24  0306   PROTTOTAL 7.5   ALBUMIN 5.2   BILITOTAL 1.3*   ALKPHOS 61   AST 23   ALT 24     Color Urine (no units)   Date Value   05/21/2024 Light Yellow     Appearance Urine (no units)   Date Value   05/21/2024 Clear     Glucose Urine (mg/dL)   Date Value   05/21/2024 Negative     Bilirubin Urine (no units)   Date Value   05/21/2024 Negative     Ketones Urine (mg/dL)   Date Value   05/21/2024 Negative     Specific Gravity Urine (no units)   Date Value   05/21/2024 1.005     pH Urine (no units)   Date Value   05/21/2024 6.5     Protein Albumin Urine (mg/dL)   Date Value   05/21/2024 10 (A)     Nitrite Urine (no units)   Date Value   05/21/2024 Negative     Leukocyte Esterase Urine (no units)   Date Value   05/21/2024 Negative         Imaging:  CT Abdomen Pelvis w Contrast   Final Result   IMPRESSION:    1.  Acute appendicitis with appendiceal dilatation, wall thickening and mild surrounding inflammatory change.  Multiple luminal appendicoliths.      2.  No perforation or abscess formation. Minimal free fluid in the pelvis.

## 2024-05-21 NOTE — ANESTHESIA CARE TRANSFER NOTE
Patient: Amador Jose    Procedure: Procedure(s):  Laparoscopic appendectomy       Diagnosis: Acute appendicitis [K35.80]  Diagnosis Additional Information: No value filed.    Anesthesia Type:   General     Note:    Oropharynx: oropharynx clear of all foreign objects  Level of Consciousness: awake  Oxygen Supplementation: nasal cannula  Level of Supplemental Oxygen (L/min / FiO2): 4  Independent Airway: airway patency satisfactory and stable  Dentition: dentition unchanged  Vital Signs Stable: post-procedure vital signs reviewed and stable  Report to RN Given: handoff report given  Patient transferred to: PACU    Handoff Report: Identifed the Patient, Identified the Reponsible Provider, Reviewed the pertinent medical history, Discussed the surgical course, Reviewed Intra-OP anesthesia mangement and issues during anesthesia, Set expectations for post-procedure period and Allowed opportunity for questions and acknowledgement of understanding      Vitals:  Vitals Value Taken Time   /75 05/21/24 1515   Temp     Pulse 98 05/21/24 1518   Resp 14 05/21/24 1518   SpO2 100 % 05/21/24 1518   Vitals shown include unfiled device data.    Electronically Signed By: FRANCIS Shanks CRNA  May 21, 2024  3:18 PM

## 2024-05-21 NOTE — ANESTHESIA PROCEDURE NOTES
Airway       Patient location during procedure: OR       Procedure Start/Stop Times: 5/21/2024 1:28 PM  Staff -        CRNA: Rachna Salguero APRN CRNA       Performed By: CRNA  Consent for Airway        Urgency: elective  Indications and Patient Condition       Indications for airway management: portillo-procedural       Induction type:intravenous       Mask difficulty assessment: 1 - vent by mask    Final Airway Details       Final airway type: endotracheal airway       Successful airway: ETT - single  Endotracheal Airway Details        ETT size (mm): 7.5       Cuffed: yes       Successful intubation technique: direct laryngoscopy       DL Blade Type: MAC 4       Grade View of Cords: 2       Adjucts: stylet       Position: Right       Measured from: lips       Secured at (cm): 23       Bite block used: None    Post intubation assessment        Placement verified by: capnometry, equal breath sounds and chest rise        Number of attempts at approach: 1       Number of other approaches attempted: 0       Secured with: pink tape       Ease of procedure: easy       Dentition: Intact and Unchanged    Medication(s) Administered   Medication Administration Time: 5/21/2024 1:28 PM

## 2024-05-21 NOTE — DISCHARGE INSTRUCTIONS
Contacting your Doctor -   To contact a doctor, call Dr Brothers at the general surgery clinic at [phone]    or:  [phone] and ask for the resident on call for general surgery (answered 24 hours a day)   Emergency Department:  [facility]: [phone]  [facility]: [phone] if you are in need of immediate or emergent help

## 2024-05-21 NOTE — ANESTHESIA POSTPROCEDURE EVALUATION
Patient: Amador Jose    Procedure: Procedure(s):  Laparoscopic appendectomy       Anesthesia Type:  General    Note:  Disposition: Inpatient   Postop Pain Control: Uneventful            Sign Out: Well controlled pain   PONV: No   Neuro/Psych: Uneventful            Sign Out: Acceptable/Baseline neuro status   Airway/Respiratory: Uneventful            Sign Out: Acceptable/Baseline resp. status   CV/Hemodynamics: Uneventful            Sign Out: Acceptable CV status; No obvious hypovolemia; No obvious fluid overload   Other NRE: NONE   DID A NON-ROUTINE EVENT OCCUR? No           Last vitals:  Vitals Value Taken Time   /74 05/21/24 1600   Temp 36.2  C (97.1  F) 05/21/24 1515   Pulse 81 05/21/24 1605   Resp 20 05/21/24 1605   SpO2 97 % 05/21/24 1605   Vitals shown include unfiled device data.    Electronically Signed By: Adrian Aleman MD  May 21, 2024  4:06 PM

## 2024-05-21 NOTE — ED PROVIDER NOTES
ED Provider Note  [facility]      History     Chief Complaint   Patient presents with    Abdominal Pain     HPI  Amador Jose is a 24 year old male who has a past medical history of congenital UPJ obstruction who presents to the emergency department for evaluation of abdominal pain.  Patient complains of severe abdominal pain that started this afternoon around 1600.  Patient describes the pain as a constant dull achy pain with occasional sharp sensation that is located around his umbilicus.  Patient reports pain has been constant however worsening throughout the evening.  Patient reports unable to sleep due to the pain.  Pain is associated with nausea and patient had 1 episode of vomiting upon arrival to the emergency department.  Patient denies any fever, chills, chest pain, shortness of breath, dysuria, hematuria, flank pain.  No medications prior to arrival.  Patient last ate around 12 noon and reports he ate Cheerios.  No other complaints.    Past Medical History  No past medical history on file.  Past Surgical History:   Procedure Laterality Date    DAVINCI PYELOPLASTY Right 7/25/2023    Procedure: robotic assisted laparoscopic right pyeloplasty, right ureteral stent placement;  Surgeon: Neeraj Mckee MD;  Location:  OR     No current outpatient medications on file.    Allergies   Allergen Reactions    Fluorescein-Benoxinate Other (See Comments)     Family History  No family history on file.  Social History   Social History     Tobacco Use    Smoking status: Never    Smokeless tobacco: Never      Past medical history, past surgical history, medications, allergies, family history, and social history were reviewed with the patient. No additional pertinent items.      A medically appropriate review of systems was performed with pertinent positives and negatives noted in the HPI, and all other systems negative.    Physical Exam   BP: 136/88  Pulse: 113  Temp: 97.6  F (36.4  " C)  Resp: 16  Height: 188 cm (6' 2\")  Weight: 74.8 kg (165 lb)  SpO2: 99 %  Physical Exam  General: Afebrile, moderate distress secondary to pain, vomiting  HEENT: Normocephalic, atraumatic, conjunctivae normal. MMM  Neck: non-tender, supple  Cardio: regular rate. regular rhythm   Resp: Normal work of breathing, no respiratory distress, lungs clear bilaterally, no wheezing, rhonchi, rales  Chest/Back: no visual signs of trauma, no CVA tenderness   Abdomen: soft, non distension, mild tenderness to palpation in right upper quadrant and right lower quadrant with no rebound, no guarding, no peritoneal signs  Neuro: alert and fully oriented. CN II-XII grossly intact. Grossly normal strength and sensation in all extremities.   MSK: no deformities. Normal range of motion  Integumentary/Skin: no rash visualized, normal color  Psych: normal affect, normal behavior      ED Course, Procedures, & Data      Procedures       Results for orders placed or performed during the hospital encounter of 05/21/24   CT Abdomen Pelvis w Contrast     Status: None    Narrative    EXAM: CT ABDOMEN PELVIS W CONTRAST  LOCATION: [facility]  DATE: 5/21/2024    INDICATION: Periumbilical abdominal pain, leukocytosis, nausea  and  vomiting  COMPARISON: 7/18/2023  TECHNIQUE: CT scan of the abdomen and pelvis was performed following injection of IV contrast. Multiplanar reformats were obtained. Dose reduction techniques were used.  CONTRAST: 101ml isovue 370    FINDINGS:   LOWER CHEST: Normal.    HEPATOBILIARY: Normal.    PANCREAS: Normal.    SPLEEN: Normal.    ADRENAL GLANDS: Stable thickening left adrenal gland, no further follow-up. Right adrenal gland negative.    KIDNEYS/BLADDER: Resolution of prior right-sided hydronephrosis. Mild cortical scarring lateral right kidney unchanged. Symmetric renal enhancement.    BOWEL: Acute appendicitis with appendiceal dilatation at 1.2 cm with associated wall " thickening and mild surrounding inflammatory change. Multiple luminal appendicoliths. No perforation or abscess formation. No bowel obstruction or inflammatory change   elsewhere.    LYMPH NODES: No lymphadenopathy.    VASCULATURE: Normal.    PELVIC ORGANS: Minimal free fluid.    MUSCULOSKELETAL: Normal.      Impression    IMPRESSION:   1.  Acute appendicitis with appendiceal dilatation, wall thickening and mild surrounding inflammatory change. Multiple luminal appendicoliths.    2.  No perforation or abscess formation. Minimal free fluid in the pelvis.                  Cunningham Draw     Status: None    Narrative    The following orders were created for panel order Cunningham Draw.  Procedure                               Abnormality         Status                     ---------                               -----------         ------                     Extra Blue Top Tube[[id number]]                              Final result               Extra Red Top Tube[[id number]]                               Final result               Extra Green Top (Lithium...[[id number]]                      Final result               Extra Purple Top Tube[[id number]]                            Final result                 Please view results for these tests on the individual orders.   Extra Blue Top Tube     Status: None   Result Value Ref Range    Hold Specimen JIC    Extra Red Top Tube     Status: None   Result Value Ref Range    Hold Specimen JIC    Extra Green Top (Lithium Heparin) Tube     Status: None   Result Value Ref Range    Hold Specimen JIC    Extra Purple Top Tube     Status: None   Result Value Ref Range    Hold Specimen JIC    Comprehensive metabolic panel     Status: Abnormal   Result Value Ref Range    Sodium 138 135 - 145 mmol/L    Potassium 3.6 3.4 - 5.3 mmol/L    Carbon Dioxide (CO2) 26 22 - 29 mmol/L    Anion Gap 16 (H) 7 - 15 mmol/L    Urea Nitrogen 11.6 6.0 - 20.0 mg/dL    Creatinine 0.95 0.67 - 1.17 mg/dL    GFR Estimate  >90 >60 mL/min/1.73m2    Calcium 9.7 8.6 - 10.0 mg/dL    Chloride 96 (L) 98 - 107 mmol/L    Glucose 117 (H) 70 - 99 mg/dL    Alkaline Phosphatase 61 40 - 150 U/L    AST 23 0 - 45 U/L    ALT 24 0 - 70 U/L    Protein Total 7.5 6.4 - 8.3 g/dL    Albumin 5.2 3.5 - 5.2 g/dL    Bilirubin Total 1.3 (H) <=1.2 mg/dL   Lipase     Status: Normal   Result Value Ref Range    Lipase 20 13 - 60 U/L   UA with Microscopic reflex to Culture     Status: Abnormal    Specimen: Urine, Midstream   Result Value Ref Range    Color Urine Light Yellow Colorless, Straw, Light Yellow, Yellow    Appearance Urine Clear Clear    Glucose Urine Negative Negative mg/dL    Bilirubin Urine Negative Negative    Ketones Urine Negative Negative mg/dL    Specific Gravity Urine 1.005 1.003 - 1.035    Blood Urine Moderate (A) Negative    pH Urine 6.5 5.0 - 7.0    Protein Albumin Urine 10 (A) Negative mg/dL    Urobilinogen Urine Normal Normal, 2.0 mg/dL    Nitrite Urine Negative Negative    Leukocyte Esterase Urine Negative Negative    Mucus Urine Present (A) None Seen /LPF    RBC Urine 161 (H) <=2 /HPF    WBC Urine 1 <=5 /HPF    Narrative    Urine Culture not indicated   CBC with platelets and differential     Status: Abnormal   Result Value Ref Range    WBC Count 21.4 (H) 4.0 - 11.0 10e3/uL    RBC Count 5.17 4.40 - 5.90 10e6/uL    Hemoglobin 17.1 13.3 - 17.7 g/dL    Hematocrit 46.8 40.0 - 53.0 %    MCV 91 78 - 100 fL    MCH 33.1 (H) 26.5 - 33.0 pg    MCHC 36.5 31.5 - 36.5 g/dL    RDW 11.7 10.0 - 15.0 %    Platelet Count 350 150 - 450 10e3/uL    % Neutrophils 87 %    % Lymphocytes 6 %    % Monocytes 6 %    % Eosinophils 0 %    % Basophils 0 %    % Immature Granulocytes 1 %    NRBCs per 100 WBC 0 <1 /100    Absolute Neutrophils 18.5 (H) 1.6 - 8.3 10e3/uL    Absolute Lymphocytes 1.4 0.8 - 5.3 10e3/uL    Absolute Monocytes 1.3 0.0 - 1.3 10e3/uL    Absolute Eosinophils 0.0 0.0 - 0.7 10e3/uL    Absolute Basophils 0.1 0.0 - 0.2 10e3/uL    Absolute Immature  Granulocytes 0.1 <=0.4 10e3/uL    Absolute NRBCs 0.0 10e3/uL   CBC with platelets differential     Status: Abnormal    Narrative    The following orders were created for panel order CBC with platelets differential.  Procedure                               Abnormality         Status                     ---------                               -----------         ------                     CBC with platelets and d...[[id number]]  Abnormal            Final result                 Please view results for these tests on the individual orders.     Medications   ondansetron (ZOFRAN) injection 4 mg (4 mg Intravenous $Given 5/21/24 0312)   sodium chloride 0.9 % infusion (has no administration in time range)   cefTRIAXone (ROCEPHIN) 1 g vial to attach to  mL bag for ADULTS or NS 50 mL bag for PEDS (has no administration in time range)   metroNIDAZOLE (FLAGYL) infusion 500 mg (has no administration in time range)   sodium chloride 0.9% BOLUS 1,000 mL (1,000 mLs Intravenous $New Bag 5/21/24 0312)   iopamidol (ISOVUE-370) solution 101 mL (101 mLs Intravenous $Given 5/21/24 5085)   sodium chloride 0.9 % bag 500mL for CT scan flush use (75 mLs Intravenous $Given 5/21/24 7227)     Labs Ordered and Resulted from Time of ED Arrival to Time of ED Departure   COMPREHENSIVE METABOLIC PANEL - Abnormal       Result Value    Sodium 138      Potassium 3.6      Carbon Dioxide (CO2) 26      Anion Gap 16 (*)     Urea Nitrogen 11.6      Creatinine 0.95      GFR Estimate >90      Calcium 9.7      Chloride 96 (*)     Glucose 117 (*)     Alkaline Phosphatase 61      AST 23      ALT 24      Protein Total 7.5      Albumin 5.2      Bilirubin Total 1.3 (*)    ROUTINE UA WITH MICROSCOPIC REFLEX TO CULTURE - Abnormal    Color Urine Light Yellow      Appearance Urine Clear      Glucose Urine Negative      Bilirubin Urine Negative      Ketones Urine Negative      Specific Gravity Urine 1.005      Blood Urine Moderate (*)     pH Urine 6.5      Protein  Albumin Urine 10 (*)     Urobilinogen Urine Normal      Nitrite Urine Negative      Leukocyte Esterase Urine Negative      Mucus Urine Present (*)     RBC Urine 161 (*)     WBC Urine 1     CBC WITH PLATELETS AND DIFFERENTIAL - Abnormal    WBC Count 21.4 (*)     RBC Count 5.17      Hemoglobin 17.1      Hematocrit 46.8      MCV 91      MCH 33.1 (*)     MCHC 36.5      RDW 11.7      Platelet Count 350      % Neutrophils 87      % Lymphocytes 6      % Monocytes 6      % Eosinophils 0      % Basophils 0      % Immature Granulocytes 1      NRBCs per 100 WBC 0      Absolute Neutrophils 18.5 (*)     Absolute Lymphocytes 1.4      Absolute Monocytes 1.3      Absolute Eosinophils 0.0      Absolute Basophils 0.1      Absolute Immature Granulocytes 0.1      Absolute NRBCs 0.0     LIPASE - Normal    Lipase 20       CT Abdomen Pelvis w Contrast   Final Result   IMPRESSION:    1.  Acute appendicitis with appendiceal dilatation, wall thickening and mild surrounding inflammatory change. Multiple luminal appendicoliths.      2.  No perforation or abscess formation. Minimal free fluid in the pelvis.                                 Critical care was not performed.     Medical Decision Making  The patient's presentation was of high complexity (an acute health issue posing potential threat to life or bodily function).    The patient's evaluation involved:  ordering and/or review of 3+ test(s) in this encounter (see separate area of note for details)  independent interpretation of testing performed by another health professional (CT)  discussion of management or test interpretation with another health professional (general surgery team)    The patient's management necessitated moderate risk (prescription drug management including medications given in the ED), high risk (a decision regarding emergency major procedure (went to the OR with operative service)), and high risk (a decision regarding hospitalization).    Assessment & Plan     Amador Jose is a 24 year old male who has a past medical history of congenital UPJ obstruction who presents to the emergency department for evaluation of abdominal pain.  On arrival patient is nontoxic-appearing, afebrile, moderate distress secondary to abdominal pain, nausea, vomiting.  Patient tachycardic upon arrival with heart rate 113, blood pressure 136/88, oxygen 99% on room air.  Differential diagnosis includes but is not limited to gastritis versus gastroenteritis versus peptic ulcer disease versus acid reflux versus pancreatitis versus cholecystitis versus biliary colic versus appendicitis versus nonspecific abdominal pain versus constipation versus obstruction among others.  Upon arrival patient was treated with IV Zofran, 1 L IV fluid bolus.  Comprehensive labs remarkable for leukocytosis with white blood cell count 21.4, absolute neutrophils 18.5. no acute metabolic or electrolyte abnormality, no transaminitis, normal lipase.  Urinalysis with moderate amount of blood, 161 red blood cells.  I personally viewed and interpreted CT scan abdomen pelvis which demonstrates acute appendicitis with appendiceal dilation, wall thickening, and mild surrounding inflammatory change.    On reevaluation patient resting comfortably, reports significant improvement of his symptoms after IV Zofran, IV fluids.  Denies any current abdominal pain, nausea.  I discussed results with patient, patient was placed on maintenance IV fluids, n.p.o., and treated with antibiotics ceftriaxone and Flagyl.  I discussed patient management with general surgery who evaluated patient in the emergency department.  Plan for transfer to the OR this morning for appendectomy.  Patient understands and agrees with plan.    I have reviewed the nursing notes. I have reviewed the findings, diagnosis, plan and need for follow up with the patient.    New Prescriptions    No medications on file       Final diagnoses:   Acute appendicitis,  unspecified acute appendicitis type       Mary Russ MD  [facility] EMERGENCY DEPARTMENT  5/21/2024     Mary Russ MD  05/21/24 0628

## 2024-05-21 NOTE — OP NOTE
[facility]    Operative Note    Pre-operative diagnosis: Acute appendicitis [K35.80]   Post-operative diagnosis * No post-op diagnosis entered *   Procedure: Procedure(s):  Laparoscopic appendectomy   Surgeon: Surgeons and Role:     * Bronwyn Brothers MD - Primary     * Wanda Whitfield MD - Resident - Assisting     * Martine Dey DO - Resident - Assisting   Anesthesia: General    Estimated blood loss: Minimal   Drains: None   Specimens: ID Type Source Tests Collected by Time Destination   1 : Appendix Tissue Appendix SURGICAL PATHOLOGY EXAM Bronwyn Brothers MD 5/21/2024  2:40 PM       Findings: Non ruptured acute appendicitis       Complications: None.   Implants: None.       OPERATIVE INDICATIONS:  This is a 24 year old M with signs and symptoms and radiologic evidence of acute appendicitis. He has undergone the informed consent process with Dr. Chauhan and has agreed to proceed with operative intervention. He has signed the consent form.     PROCEDURE:   The patient was brought to the operating room and placed in the supine position. General anesthesia was induced and an endotracheal tube was placed. A ba was placed. The left arm was tucked. The abdomen was prepped and draped in the usual sterile fashion. A timeout for safety was performed. The abdomen was accessed in the LUQ at [place] with a Veress needle and insufflated to 15 mmHg. A 5 mm port was placed here using the Optiview technique. Two more 5 mm ports were placed under direct visualization. There was some murky fluid in the pelvis. The cecum was mobilized off of the right lateral abdominal wall using the Ligasure. The appendix was inflamed but not perforated. The end of the appendix was grasped and pulled into the pelvis. The appendiceal mesentery was taken with the Ligasure down towards the base. The appendix was then ligated with 2x 0 PDS endoloops and the  appendix was divided using the Ligasure. The specimen was placed in a 5 mm bag and removed from the abdomen. The small amount of fluid in the pelvis was suctioned. Hemostasis was achieved. We once again examined the endoloops and were happy with placement. The abdomen was de-sufflated. The port sites were closed with 4-0 vicryl and dressed with steri strips and bandages. The counts were correct. The patient tolerated the procedure.     Dr. Chauhan was present for the entire case    Anastacia Whitfield MD    Surgery Resident       Attending addendum: I was present for entirety of case including closure of fascia.

## 2024-05-21 NOTE — ANESTHESIA PREPROCEDURE EVALUATION
Anesthesia Pre-Procedure Evaluation    Patient: Amador Jose   MRN: [id number] : 2000        Procedure : Procedure(s):  Laparoscopic appendectomy          No past medical history on file.   Past Surgical History:   Procedure Laterality Date    DAVINCI PYELOPLASTY Right 2023    Procedure: robotic assisted laparoscopic right pyeloplasty, right ureteral stent placement;  Surgeon: Neeraj Mckee MD;  Location: [facility] OR      Allergies   Allergen Reactions    Fluorescein-Benoxinate Other (See Comments)      Social History     Tobacco Use    Smoking status: Never    Smokeless tobacco: Never   Substance Use Topics    Alcohol use: Not on file      Wt Readings from Last 1 Encounters:   24 74.8 kg (165 lb)        Anesthesia Evaluation   Pt has had prior anesthetic. Type: General.        ROS/MED HX  ENT/Pulmonary:  - neg pulmonary ROS  (-) sleep apnea   Neurologic:  - neg neurologic ROS     Cardiovascular:  - neg cardiovascular ROS  (-) hypertension   METS/Exercise Tolerance:     Hematologic:       Musculoskeletal:       GI/Hepatic:    (-) GERD   Renal/Genitourinary: Comment: Elevated creatinine, hydronephrosis    (+) renal disease,             Endo:  - neg endo ROS     Psychiatric/Substance Use:  - neg psychiatric ROS     Infectious Disease:       Malignancy:       Other:            Physical Exam    Airway        Mallampati: II   TM distance: > 3 FB   Neck ROM: full   Mouth opening: > 3 cm    Respiratory Devices and Support         Dental       (+) Completely normal teeth      Cardiovascular   cardiovascular exam normal          Pulmonary   pulmonary exam normal                OUTSIDE LABS:  CBC:   Lab Results   Component Value Date    WBC 21.4 (H) 2024    WBC 13.0 (H) 2023    HGB 17.1 2024    HGB 14.1 2023    HCT 46.8 2024    HCT 39.5 (L) 2023     2024     2023     BMP:   Lab Results   Component Value Date     2024      "10/27/2023    POTASSIUM 3.6 05/21/2024    POTASSIUM 4.4 10/27/2023    CHLORIDE 96 (L) 05/21/2024    CHLORIDE 101 10/27/2023    CO2 26 05/21/2024    CO2 28 10/27/2023    BUN 11.6 05/21/2024    BUN 12.6 10/27/2023    CR 0.95 05/21/2024    CR 1.05 10/27/2023     (H) 05/21/2024    GLC 88 10/27/2023     COAGS: No results found for: \"PTT\", \"INR\", \"FIBR\"  POC: No results found for: \"BGM\", \"HCG\", \"HCGS\"  HEPATIC:   Lab Results   Component Value Date    ALBUMIN 5.2 05/21/2024    PROTTOTAL 7.5 05/21/2024    ALT 24 05/21/2024    AST 23 05/21/2024    ALKPHOS 61 05/21/2024    BILITOTAL 1.3 (H) 05/21/2024     OTHER:   Lab Results   Component Value Date    RANDI 9.7 05/21/2024    LIPASE 20 05/21/2024       Anesthesia Plan    ASA Status:  1, emergent       Anesthesia Type: General.     - Airway: ETT   Induction: Propofol, RSI.   Maintenance: Balanced.   Techniques and Equipment:     - Airway: Video-Laryngoscope     - Lines/Monitors: 2nd IV     Consents    Anesthesia Plan(s) and associated risks, benefits, and realistic alternatives discussed. Questions answered and patient/representative(s) expressed understanding.     - Discussed: Risks, Benefits and Alternatives for the PROCEDURE were discussed     - Discussed with:  Patient      - Extended Intubation/Ventilatory Support Discussed: No.      - Patient is DNR/DNI Status: No     Use of blood products discussed: Yes.     - Discussed with: Patient.     - Consented: consented to blood products     Postoperative Care    Pain management: IV analgesics, Oral pain medications, Multi-modal analgesia.   PONV prophylaxis: Ondansetron (or other 5HT-3), Dexamethasone or Solumedrol     Comments:               Chasity Greenberg MD    I have reviewed the pertinent notes and labs in the chart from the past 30 days and (re)examined the patient.  Any updates or changes from those notes are reflected in this note.                  "

## 2024-05-23 ENCOUNTER — PATIENT OUTREACH (OUTPATIENT)
Dept: SURGERY | Facility: CLINIC | Age: 24
End: 2024-05-23
Payer: COMMERCIAL

## 2024-05-23 NOTE — PROGRESS NOTES
RN Post-Op/Post-Discharge Care Coordination Note    Mr. Amador Jose is a 24 year old male who underwent laparoscopic appendectomy on 5/21 with  Dr. Bronwyn Brothers.  Spoke with Patient.    Support  Patient able to care for self independently     Health Status  Nausea/Vomiting: Patient denies nausea/vomiting.  Eating/drinking: Patient is able to eat and drink without any complaints.  Bowel habits: Patient reports having a normal bowel movement.  Drains (DUNCAN): N/A  Fevers/chills: Patient denies any fever or chills.  Incisions: Patient denies any signs and symptoms of infection..  Wound closure:  Steri-strips  Pain: Improved. He has transitioned to Tylenol 1000 mg QID PRN. Patient aware of maximum Tylenol/24 hours.  New Medications:  Oxycodone    Activity/Restrictions  No lifting in excess of 15-20 pounds for 3-4 weeks    Equipment  None    Pathology reviewed with patient:  No, not yet available    Forms/Letters  No    All of his questions were answered including reviewing restrictions and wound care.  He will call this office if he has any further questions and/or concerns.      In lieu of a post-op clinic patient that patient would like to be contacted in approximately 7-10 days via telephone.    A copy of this note was routed to the primary surgeon.      Whom and When to Call  Patient acknowledges understanding of how to manage any medication changes and   when to seek medical care.     Patient advised that if after hour medical concerns arise to please call [phone] and choose option 4 to speak to the physician on call.

## 2024-05-24 LAB
PATH REPORT.COMMENTS IMP SPEC: NORMAL
PATH REPORT.COMMENTS IMP SPEC: NORMAL
PATH REPORT.FINAL DX SPEC: NORMAL
PATH REPORT.GROSS SPEC: NORMAL
PATH REPORT.MICROSCOPIC SPEC OTHER STN: NORMAL
PATH REPORT.RELEVANT HX SPEC: NORMAL
PHOTO IMAGE: NORMAL

## 2024-05-30 ENCOUNTER — PATIENT OUTREACH (OUTPATIENT)
Dept: SURGERY | Facility: CLINIC | Age: 24
End: 2024-05-30
Payer: COMMERCIAL

## 2024-05-30 NOTE — PROGRESS NOTES
Amador Jose was contacted several days post procedure via telephone for a status update and elected to have a telephone follow -up call approximately 7-10 days after original contact in lieu of a clinic visit with Dr. Bronwyn Brothers.  He continues to do well and the steri-strips  have peeled off.  The patients wounds are healed and the area is C/D/I.  He is afebrile, pain free, and aaliyah po; the patient is slowly resuming his normal activity.   Discussed restrictions including no lifting in excess of 15-20 pounds for 3 weeks.    Pathology was reviewed with the patient: Yes    All of Amador Jose question's were answered and  he would like to return to the clinic on a PRN basis.  The patient is aware that  he may schedule a post op appointment at any time.    A copy of this note was routed to the patients surgeon.      Pathology:   Case Report   Surgical Pathology Report                         Case: [id number]                                   Authorizing Provider:  Bronwyn Brothers         Collected:           05/21/2024 02:40 PM                                  MD Jose Eduardo                                                                 Ordering Location:      MAIN OR                 Received:            05/21/2024 04:54 PM           Pathologist:           Ronald Magallanes MD                                                                 Specimen:    Appendix                                                                                  Final Diagnosis   A. APPENDIX, APPENDECTOMY:  -Acute suppurative appendicitis

## 2024-08-12 ENCOUNTER — HOSPITAL ENCOUNTER (OUTPATIENT)
Dept: ULTRASOUND IMAGING | Facility: CLINIC | Age: 24
Discharge: HOME | End: 2024-08-12
Attending: STUDENT IN AN ORGANIZED HEALTH CARE EDUCATION/TRAINING PROGRAM | Admitting: STUDENT IN AN ORGANIZED HEALTH CARE EDUCATION/TRAINING PROGRAM
Payer: COMMERCIAL

## 2024-08-12 ENCOUNTER — HOSPITAL ENCOUNTER (OUTPATIENT)
Dept: NUCLEAR MEDICINE | Facility: CLINIC | Age: 24
Setting detail: NUCLEAR MEDICINE
Discharge: HOME | End: 2024-08-12
Attending: STUDENT IN AN ORGANIZED HEALTH CARE EDUCATION/TRAINING PROGRAM | Admitting: STUDENT IN AN ORGANIZED HEALTH CARE EDUCATION/TRAINING PROGRAM
Payer: COMMERCIAL

## 2024-08-12 ENCOUNTER — LAB (OUTPATIENT)
Dept: LAB | Facility: CLINIC | Age: 24
End: 2024-08-12
Payer: COMMERCIAL

## 2024-08-12 DIAGNOSIS — N13.5 OBSTRUCTION OF RIGHT URETEROPELVIC JUNCTION (UPJ): ICD-10-CM

## 2024-08-12 LAB
ANION GAP SERPL CALCULATED.3IONS-SCNC: 12 MMOL/L (ref 7–15)
BUN SERPL-MCNC: 12.7 MG/DL (ref 6–20)
CALCIUM SERPL-MCNC: 9.2 MG/DL (ref 8.8–10.4)
CHLORIDE SERPL-SCNC: 100 MMOL/L (ref 98–107)
CREAT SERPL-MCNC: 1.01 MG/DL (ref 0.67–1.17)
EGFRCR SERPLBLD CKD-EPI 2021: >90 ML/MIN/1.73M2
GLUCOSE SERPL-MCNC: 92 MG/DL (ref 70–99)
HCO3 SERPL-SCNC: 25 MMOL/L (ref 22–29)
POTASSIUM SERPL-SCNC: 4 MMOL/L (ref 3.4–5.3)
SODIUM SERPL-SCNC: 137 MMOL/L (ref 135–145)

## 2024-08-12 PROCEDURE — 36415 COLL VENOUS BLD VENIPUNCTURE: CPT

## 2024-08-12 PROCEDURE — 78708 K FLOW/FUNCT IMAGE W/DRUG: CPT

## 2024-08-12 PROCEDURE — 250N000011 HC RX IP 250 OP 636: Performed by: STUDENT IN AN ORGANIZED HEALTH CARE EDUCATION/TRAINING PROGRAM

## 2024-08-12 PROCEDURE — 80048 BASIC METABOLIC PNL TOTAL CA: CPT

## 2024-08-12 PROCEDURE — A9562 TC99M MERTIATIDE: HCPCS | Performed by: STUDENT IN AN ORGANIZED HEALTH CARE EDUCATION/TRAINING PROGRAM

## 2024-08-12 PROCEDURE — 76770 US EXAM ABDO BACK WALL COMP: CPT

## 2024-08-12 PROCEDURE — 343N000001 HC RX 343: Performed by: STUDENT IN AN ORGANIZED HEALTH CARE EDUCATION/TRAINING PROGRAM

## 2024-08-12 RX ORDER — FUROSEMIDE 10 MG/ML
40 INJECTION INTRAMUSCULAR; INTRAVENOUS ONCE
Status: COMPLETED | OUTPATIENT
Start: 2024-08-12 | End: 2024-08-12

## 2024-08-12 RX ADMIN — TECHNESCAN TC 99M MERTIATIDE 8.7 MILLICURIE: 1 INJECTION, POWDER, LYOPHILIZED, FOR SOLUTION INTRAVENOUS at 12:35

## 2024-08-12 RX ADMIN — FUROSEMIDE 40 MG: 10 INJECTION, SOLUTION INTRAMUSCULAR; INTRAVENOUS at 12:45

## 2024-08-17 ENCOUNTER — MEDICAL CORRESPONDENCE (OUTPATIENT)
Dept: HEALTH INFORMATION MANAGEMENT | Facility: CLINIC | Age: 24
End: 2024-08-17

## 2024-08-27 ENCOUNTER — VIRTUAL VISIT (OUTPATIENT)
Dept: UROLOGY | Facility: CLINIC | Age: 24
End: 2024-08-27
Payer: COMMERCIAL

## 2024-08-27 DIAGNOSIS — N13.5 OBSTRUCTION OF RIGHT URETEROPELVIC JUNCTION (UPJ): Primary | ICD-10-CM

## 2024-08-27 PROCEDURE — 99213 OFFICE O/P EST LOW 20 MIN: CPT | Mod: 95 | Performed by: STUDENT IN AN ORGANIZED HEALTH CARE EDUCATION/TRAINING PROGRAM

## 2024-08-27 RX ORDER — CIPROFLOXACIN 500 MG/1
TABLET, FILM COATED ORAL
COMMUNITY
Start: 2024-08-26

## 2024-08-27 NOTE — LETTER
8/27/2024       RE: Amador Jose  [address] [facility] [address]     Dear Colleague,    Thank you for referring your patient, Amador Jose, to the [facility]OLOG[facility] [place] at [facility]. Please see a copy of my visit note below.    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 Had an appendectomy in May and recovered will     PHYSICAL EXAM  Patient is a 24 year old  male   Vitals: There were no vitals taken for this visit.  There is no height or weight on file to calculate BMI.  General Appearance Adult:   Alert, no acute distress, oriented  HENT: throat/mouth:normal, good dentition  Lungs: no respiratory distress, or pursed lip breathing  Heart: No obvious jugular venous distension present  Abdomen: nondistended  Musculoskeltal: extremities normal, no peripheral edema  Skin: no suspicious lesions or rashes  Neuro: Alert, oriented, speech and mentation normal  Psych: affect and mood normal  Gait: Normal  : deferred    All pertinent imaging reviewed:    NM renal scan 8/12/2024    IMPRESSION:      Normal renogram without evidence of obstruction.    Renal ultrasound 8/12/2024    IMPRESSION:  1.  Minimal right pelvic fullness is similar to the recent CT and  possibly physiologic.  2.  Otherwise, unremarkable sonographic appearance of the kidneys.      CT 5/21/2024    KIDNEYS/BLADDER: Resolution of prior right-sided hydronephrosis. Mild cortical scarring lateral right kidney unchanged. Symmetric renal enhancement.     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 Normal renal function    Follow up as needed as issues arise      Neeraj Mckee MD   [facility] Urology  [facility] Phone: [phone]          Virtual Visit Details    Type of service:  Video Visit   Video Start Time: 9:59 AM  Video End Time:10:11 AM    Originating Location (pt. Location): Home    Distant Location (provider location):  On-site  Platform used for Video Visit: [facility]      Again, thank you for allowing me to participate in the care of your patient.      Sincerely,    Neeraj Mckee MD

## 2024-08-27 NOTE — NURSING NOTE
Current patient location: [address] [place] [id number]    Is the patient currently in the state of [place]? YES    Visit mode:VIDEO    If the visit is dropped, the patient can be reconnected by: VIDEO VISIT: Text to cell phone:   Telephone Information:   Mobile [phone]       Will anyone else be joining the visit? NO  (If patient encounters technical issues they should call [phone])    How would you like to obtain your AVS? MyChart    Are changes needed to the allergy or medication list? No, Pt stated no changes to allergies, and Pt stated no med changes    Are refills needed on medications prescribed by this physician? NO    Rooming Documentation:  Not applicable      Reason for visit: DANIEL GONZALEZ

## 2024-08-27 NOTE — PROGRESS NOTES
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 Had an appendectomy in May and recovered will     PHYSICAL EXAM  Patient is a 24 year old  male   Vitals: There were no vitals taken for this visit.  There is no height or weight on file to calculate BMI.  General Appearance Adult:   Alert, no acute distress, oriented  HENT: throat/mouth:normal, good dentition  Lungs: no respiratory distress, or pursed lip breathing  Heart: No obvious jugular venous distension present  Abdomen: nondistended  Musculoskeltal: extremities normal, no peripheral edema  Skin: no suspicious lesions or rashes  Neuro: Alert, oriented, speech and mentation normal  Psych: affect and mood normal  Gait: Normal  : deferred    All pertinent imaging reviewed:    NM renal scan 8/12/2024    IMPRESSION:      Normal renogram without evidence of obstruction.    Renal ultrasound 8/12/2024    IMPRESSION:  1.  Minimal right pelvic fullness is similar to the recent CT and  possibly physiologic.  2.  Otherwise, unremarkable sonographic appearance of the kidneys.      CT 5/21/2024    KIDNEYS/BLADDER: Resolution of prior right-sided hydronephrosis. Mild cortical scarring lateral right kidney unchanged. Symmetric renal enhancement.     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 Normal renal function    Follow up as needed as issues arise      Neeraj Mckee MD   [facility] Urology  [facility] Phone: [phone]          Virtual Visit Details    Type of service:  Video Visit   Video Start Time: 9:59 AM  Video End Time:10:11 AM    Originating Location (pt. Location): Home    Distant Location (provider location):  On-site  Platform used for Video Visit: [employer]

## 2024-09-17 ENCOUNTER — MEDICAL CORRESPONDENCE (OUTPATIENT)
Dept: HEALTH INFORMATION MANAGEMENT | Facility: CLINIC | Age: 24
End: 2024-09-17
Payer: COMMERCIAL

## 2024-09-17 ENCOUNTER — TRANSFERRED RECORDS (OUTPATIENT)
Dept: HEALTH INFORMATION MANAGEMENT | Facility: CLINIC | Age: 24
End: 2024-09-17
Payer: COMMERCIAL

## 2024-09-17 LAB
C TRACH DNA SPEC QL PROBE+SIG AMP: NEGATIVE
N GONORRHOEA DNA SPEC QL PROBE+SIG AMP: NEGATIVE
SPECIMEN DESCRIP: NORMAL
SPECIMEN DESCRIPTION: NORMAL

## 2024-09-18 ENCOUNTER — TRANSCRIBE ORDERS (OUTPATIENT)
Dept: OTHER | Age: 24
End: 2024-09-18

## 2024-09-18 ENCOUNTER — TELEPHONE (OUTPATIENT)
Dept: SURGERY | Facility: CLINIC | Age: 24
End: 2024-09-18
Payer: COMMERCIAL

## 2024-09-18 DIAGNOSIS — A54.6 GONOCOCCAL PROCTITIS: Primary | ICD-10-CM

## 2024-09-18 DIAGNOSIS — K61.1 PERIRECTAL ABSCESS: ICD-10-CM

## 2024-09-18 NOTE — TELEPHONE ENCOUNTER
Records Requested    Facility  [facility]  Phone: [phone]   Outcome * 9/18/24 10:19 AM Talked to Sukumar with [facility] medical records to beget records urgently faxed over on the patient. They stated will fax them over right away. - Gay    * 9/19/24 7:16 AM Records received from [facility] and sent to HIM to be scanned into the chart. - Gay Dejesus:  9/17/24, 9/4/24, 8/26/24 - [facility] OV with Dr. Andre

## 2024-09-19 NOTE — TELEPHONE ENCOUNTER
Diagnosis, Referred by & from: Gonococcal Proctitis, Perirectal Abscess   Appt date: 12/2/2024   NOTES STATUS DETAILS   OFFICE NOTE from referring provider Received [facility]:  9/17/24, 9/4/24, 8/26/24 - [facility] OV with Dr. Best   OFFICE NOTE from other specialist N/A    DISCHARGE SUMMARY from hospital Internal [facility]:  5/21/24 - ED OV with Dr. Chaney   DISCHARGE REPORT from the ER N/A    OPERATIVE REPORT Internal [facility]:  5/21/24 - OP Note for APPENDECTOMY, LAPAROSCOPIC, possible open appendectomy with Dr. Brothers   MEDICATION LIST Received    LABS     ANAL PAP/CEA N/A    BIOPSIES/PATHOLOGY RELATED TO DIAGNOSIS N/A    DIAGNOSTIC PROCEDURES     PFC TESTING (from the Pelvic floor center includes Manometry, PDNL, EMG, etc.) N/A    COLONOSCOPY (most recent all time after 5 years) N/A    FLEX SIGMOIDOSCOPY N/A    UPPER ENDOSCOPY (EGD) N/A    ERCP N/A    IMAGING (DISC & REPORT)      CT N/A    MRI N/A    XRAY N/A    ULTRASOUND  (ENDOANAL/ENDORECTAL) N/A

## 2024-09-24 NOTE — PROGRESS NOTES
Colon and Rectal Surgery Clinic Note    RE: Amador Jose.  : 2000.  VASHTI: 10/8/2024.    Reason for visit: Possible hemorrhoids.    HPI: Amador is a 24 year old, healthy male who is here to discuss a possible anal fistula. He has never had a colonoscopy. He had an appendectomy in May 2024 for acute appendicitis. Pathology returned as Acute suppurative appendicitis.  He had a perianal abscess drained at [facility] one month ago.  Since then the pain and drainage has waxed and waned.  He also thinks that at one time he had purulent drainage from the anus itself.  He admits that today is the best it has looked and felt in the past month.    Medical history:  -gonnococcal proctitis  -perianal abscess    Surgical history:  Past Surgical History:   Procedure Laterality Date    DAVINCI PYELOPLASTY Right 2023    Procedure: robotic assisted laparoscopic right pyeloplasty, right ureteral stent placement;  Surgeon: Neeraj Mckee MD;  Location:  OR    LAPAROSCOPIC APPENDECTOMY N/A 2024    Procedure: Laparoscopic appendectomy;  Surgeon: Bronwyn Brothers MD;  Location:  OR       Family history:  No family history on file.  IBD or GI malignancy    Medications:  Current Outpatient Medications   Medication Sig Dispense Refill    ciprofloxacin (CIPRO) 500 MG tablet       oxyCODONE (ROXICODONE) 5 MG tablet Take 1 tablet (5 mg) by mouth every 6 hours as needed for pain 6 tablet 0       Allergies:  Allergies   Allergen Reactions    Fluorescein-Benoxinate Other (See Comments)       Social history:   Social History     Tobacco Use    Smoking status: Never    Smokeless tobacco: Never   Substance Use Topics    Alcohol use: Not on file     Marital status: single.    ROS:  A complete review of systems was performed with the patient and all systems negative except as per HPI.    Physical Examination:  Exam was chaperoned by Mariel Long CMA  There were no vitals taken for this visit.  General: Well hydrated.  No acute distress.  Abdomen: Soft, NT, not distended. No inguinal adenopathy palpated.  Perianal external examination:  Perianal skin: Intact with no excoriation or lichenification.  Lesions: there is a small area that looks like external opening of an anal fistula in the right posterior perianal skin.  Eversion of buttocks: There  evidence of an anal fissure. Details: N/A.  Skin tags or external hemorrhoids: No.  Digital rectal examination: Was performed.   Sphincter tone: Good.  Palpable lesions: No.  Other: none.  Bimanual examination: was not performed.    Anoscopy: Was performed.   Hemorrhoids: No significant internal hemorrhoids.  Lesions: possible midilne internal opening at the dentate line    Procedures:  None today.    Laboratory values reviewed:  Recent Labs   Lab Test 08/12/24  1103 05/21/24  0306   WBC  --  21.4*   HGB  --  17.1   PLT  --  350   CR 1.01 0.95   ALBUMIN  --  5.2   BILITOTAL  --  1.3*   ALKPHOS  --  61   ALT  --  24   AST  --  23       Imaging personally reviewed by me:  CT A/P (5/2024)  IMPRESSION:   1.  Acute appendicitis with appendiceal dilatation, wall thickening and mild surrounding inflammatory change. Multiple luminal appendicoliths.  2.  No perforation or abscess formation. Minimal free fluid in the pelvis.      ASSESSMENT  23 y/o gentleman with anal fistula.     Risks, benefits, and alternatives of operative treatment were thoroughly discussed with the patient, he/she understands these well and agrees to proceed.    PLAN  1. Pelvic MRI  2. OR for EUA, possible seton, possible fistulotomy    45 minutes spent on the date of the encounter doing chart review, history and exam, imaging review, documentation and further activities as noted above.    Emeterio Lindsey MD, PhD    Division of Colon and Rectal Surgery  [facility]    Referring Provider:  Dav Best MD  [address]  [address]     Primary Care Provider:  Luis Alfredo Underwood

## 2024-09-24 NOTE — TELEPHONE ENCOUNTER
Diagnosis, Referred by & from: Gonococcal proctitis and Perirectal abscess    Appt date: 10/8/2024    NOTES STATUS DETAILS   OFFICE NOTE from referring provider  Dav Best MD    OFFICE NOTE from other specialist  Records from [facility] are in [employer]   MEDICATION LIST Internal      Records Requested  09/24/24    Facility     Outcome 9/24/2024 12:39pm JANAY GUTIERREZ called pt Amador [facility] recs are in [employer].

## 2024-10-08 ENCOUNTER — OFFICE VISIT (OUTPATIENT)
Dept: SURGERY | Facility: CLINIC | Age: 24
End: 2024-10-08
Attending: FAMILY MEDICINE
Payer: COMMERCIAL

## 2024-10-08 ENCOUNTER — PRE VISIT (OUTPATIENT)
Dept: SURGERY | Facility: CLINIC | Age: 24
End: 2024-10-08

## 2024-10-08 VITALS
DIASTOLIC BLOOD PRESSURE: 81 MMHG | OXYGEN SATURATION: 99 % | WEIGHT: 160 LBS | HEART RATE: 95 BPM | SYSTOLIC BLOOD PRESSURE: 153 MMHG | HEIGHT: 74 IN | BODY MASS INDEX: 20.53 KG/M2

## 2024-10-08 DIAGNOSIS — K60.30 ANAL FISTULA: Primary | ICD-10-CM

## 2024-10-08 DIAGNOSIS — A54.6 GONOCOCCAL PROCTITIS: ICD-10-CM

## 2024-10-08 DIAGNOSIS — K61.1 PERIRECTAL ABSCESS: ICD-10-CM

## 2024-10-08 PROCEDURE — 99204 OFFICE O/P NEW MOD 45 MIN: CPT | Performed by: SURGERY

## 2024-10-08 ASSESSMENT — PAIN SCALES - GENERAL: PAINLEVEL: NO PAIN (0)

## 2024-10-08 NOTE — LETTER
10/8/2024       RE: Amador Jose  [address] [facility] [address]     Dear Colleague,    Thank you for referring your patient, Amador Jose, to the [facility] COLON AND RECTAL SURGERY CLINIC [place] [facility]. Please see a copy of my visit note below.    Colon and Rectal Surgery Clinic Note    RE: Amador Jose.  : 2000.  VASHTI: 10/8/2024.    Reason for visit: Possible hemorrhoids.    HPI: Amador is a 24 year old, healthy male who is here to discuss a possible anal fistula. He has never had a colonoscopy. He had an appendectomy in May 2024 for acute appendicitis. Pathology returned as Acute suppurative appendicitis.  He had a perianal abscess drained at [place] one month ago.  Since then the pain and drainage has waxed and waned.  He also thinks that at one time he had purulent drainage from the anus itself.  He admits that today is the best it has looked and felt in the past month.    Medical history:  -gonnococcal proctitis  -perianal abscess    Surgical history:  Past Surgical History:   Procedure Laterality Date     DAVINCI PYELOPLASTY Right 2023    Procedure: robotic assisted laparoscopic right pyeloplasty, right ureteral stent placement;  Surgeon: Neeraj Mckee MD;  Location:  OR     LAPAROSCOPIC APPENDECTOMY N/A 2024    Procedure: Laparoscopic appendectomy;  Surgeon: Bronwyn Brothers MD;  Location:  OR       Family history:  No family history on file.  IBD or GI malignancy    Medications:  Current Outpatient Medications   Medication Sig Dispense Refill     ciprofloxacin (CIPRO) 500 MG tablet        oxyCODONE (ROXICODONE) 5 MG tablet Take 1 tablet (5 mg) by mouth every 6 hours as needed for pain 6 tablet 0       Allergies:  Allergies   Allergen Reactions     Fluorescein-Benoxinate Other (See Comments)       Social history:   Social History     Tobacco Use     Smoking status: Never     Smokeless  tobacco: Never   Substance Use Topics     Alcohol use: Not on file     Marital status: single.    ROS:  A complete review of systems was performed with the patient and all systems negative except as per HPI.    Physical Examination:  Exam was chaperoned by Mariel Long CMA  There were no vitals taken for this visit.  General: Well hydrated. No acute distress.  Abdomen: Soft, NT, not distended. No inguinal adenopathy palpated.  Perianal external examination:  Perianal skin: Intact with no excoriation or lichenification.  Lesions: there is a small area that looks like external opening of an anal fistula in the right posterior perianal skin.  Eversion of buttocks: There  evidence of an anal fissure. Details: N/A.  Skin tags or external hemorrhoids: No.  Digital rectal examination: Was performed.   Sphincter tone: Good.  Palpable lesions: No.  Other: none.  Bimanual examination: was not performed.    Anoscopy: Was performed.   Hemorrhoids: No significant internal hemorrhoids.  Lesions: possible midilne internal opening at the dentate line    Procedures:  None today.    Laboratory values reviewed:  Recent Labs   Lab Test 08/12/24  1103 05/21/24  0306   WBC  --  21.4*   HGB  --  17.1   PLT  --  350   CR 1.01 0.95   ALBUMIN  --  5.2   BILITOTAL  --  1.3*   ALKPHOS  --  61   ALT  --  24   AST  --  23       Imaging personally reviewed by me:  CT A/P (5/2024)  IMPRESSION:   1.  Acute appendicitis with appendiceal dilatation, wall thickening and mild surrounding inflammatory change. Multiple luminal appendicoliths.  2.  No perforation or abscess formation. Minimal free fluid in the pelvis.      ASSESSMENT  23 y/o gentleman with anal fistula.     Risks, benefits, and alternatives of operative treatment were thoroughly discussed with the patient, he/she understands these well and agrees to proceed.    PLAN  1. Pelvic MRI  2. OR for EUA, possible seton, possible fistulotomy    45 minutes spent on the date of the encounter doing  chart review, history and exam, imaging review, documentation and further activities as noted above.    Emeterio Lindsey MD, PhD    Division of Colon and Rectal Surgery  [facility]    Referring Provider:  Dav Best MD  [address] [address]     Primary Care Provider:  Luis Alfredo Underwood      Again, thank you for allowing me to participate in the care of your patient.      Sincerely,    Emeterio Lindsey MD

## 2024-10-08 NOTE — PATIENT INSTRUCTIONS
Follow up:    We ordered a Pelvic MRI for fistula evaluation, you can schedule this on your way out.    Scheduling will give you a call within three business days to schedule surgery    Appointment you will need in prep: pre op physical with our anesthesia team or your PCP within 30 days of procedure  You will need to do a 2 fleet enema bowel prep the day of surgery. You will receive the instructions in a surgery packet via mail and "[employer]t.    BOWEL PREP: FLEET ENEMA INSTRUCTIONS     Purchase 2 Fleet enemas over the counter at any local pharmacy or store.  There is no prescription required.  Start your prep 2 hours prior to your appointment check-in time.     2 Hours Prior to Arrival for Your Procedure or Surgery     1. Remove orange protective shield from enema Comfortip before inserting.     2. With steady pressure, gently insert enema tip into rectum with a slight side-to-side movement, with tip pointing toward the navel.  Insertion may be easier if you bear down, as if you are having a bowel movement.     3. Do not force the enema tip into rectum, as this can cause injury.     4. squeeze bottle until nearly all liquid is gone.  It is not neccessary to empty the bottle      5. Remove Comforttip from rectum, and maintain position until you feel the urge to evacuate is strong (usually 2-5 minutes).       Repeat the same steps for the second enema 30 minutes after completing the first enema.      BRANDY García [phone]    [facility] Fax Number [phone]    Surgery Scheduling [phone]

## 2024-10-08 NOTE — NURSING NOTE
"Chief Complaint   Patient presents with    Consult     Anal Fistula       Vitals:    10/08/24 0918   BP: (!) 153/81   BP Location: Left arm   Patient Position: Sitting   Cuff Size: Adult Regular   Pulse: 95   SpO2: 99%   Weight: 160 lb   Height: 6' 2\"       Body mass index is 20.54 kg/m .    Mariel Long CMA    "

## 2024-10-11 ENCOUNTER — TELEPHONE (OUTPATIENT)
Dept: SURGERY | Facility: CLINIC | Age: 24
End: 2024-10-11
Payer: COMMERCIAL

## 2024-10-11 NOTE — TELEPHONE ENCOUNTER
Called and spoke with patient regarding scheduling procedure with Dr. Lindsey.    Writer offered 2/20/25 CSC. Patient inquired about other possible dates; writer stated once surgery is scheduled, patient could be placed on cancellation list to move up if time became available. Patient stated they will call back next week to proceed with scheduling. Writer provided CR scheduling line [phone].     Randy Foreman on 10/11/2024 at 11:47 AM

## 2024-10-15 PROBLEM — K60.30 ANAL FISTULA: Status: ACTIVE | Noted: 2024-10-08

## 2024-10-15 NOTE — TELEPHONE ENCOUNTER
Received call to schedule surgery with Dr. Lindsey    Spoke with: Amador     Date of Surgery: 11/20    Estimated Arrival time Discussed with Patient:   10:30 AM    Location of surgery: [facility]     Pre-Op H&P: patient [facility]    WOC: Not Applicable     Labs: Not Applicable     Imaging: Not Applicable     Post-Op Appt Date w/ NP/PA:  FRANCIS Telles, CNP 11/27 at 10:30 AM    Bowel Prep:  Yes  2 Fleets Sent via [email or web address] Message    Discussed with patient pre-op RN will call 2-3 days prior to surgery with arrival time and instructions:  Yes       Standard Surgery Packet Sent: Yes 10/15/24  via [email or web address] Message      Additional Comments:       All patients questions were answered and was instructed to review surgical packet and call back with any questions or concerns.       Maru Lackey on 10/15/2024 at 4:00 PM

## 2024-10-15 NOTE — TELEPHONE ENCOUNTER
Left voicemail for patient regarding scheduling surgery with Dr. Lindsey     Provided direct contact number to discuss  P: [phone]    Maru Lackey on 10/15/2024 at 3:07 PM

## 2024-11-08 ENCOUNTER — ANCILLARY PROCEDURE (OUTPATIENT)
Dept: MRI IMAGING | Facility: CLINIC | Age: 24
End: 2024-11-08
Attending: SURGERY
Payer: COMMERCIAL

## 2024-11-08 DIAGNOSIS — K60.30 ANAL FISTULA: ICD-10-CM

## 2024-11-08 PROCEDURE — A9585 GADOBUTROL INJECTION: HCPCS | Mod: JZ | Performed by: RADIOLOGY

## 2024-11-08 PROCEDURE — 72197 MRI PELVIS W/O & W/DYE: CPT | Performed by: RADIOLOGY

## 2024-11-08 RX ORDER — GADOBUTROL 604.72 MG/ML
7.5 INJECTION INTRAVENOUS ONCE
Status: COMPLETED | OUTPATIENT
Start: 2024-11-08 | End: 2024-11-08

## 2024-11-08 RX ADMIN — GADOBUTROL 7.5 ML: 604.72 INJECTION INTRAVENOUS at 16:09

## 2024-11-08 NOTE — DISCHARGE INSTRUCTIONS
MRI Contrast Discharge Instructions    The IV contrast you received today will pass out of your body in your  urine. This will happen in the next 24 hours. You will not feel this process.  Your urine will not change color.    Drink at least 4 extra glasses of water or juice today (unless your doctor  has restricted your fluids). This reduces the stress on your kidneys.  You may take your regular medicines.    If you are on dialysis: It is best to have dialysis today.    If you have a reaction: Most reactions happen right away. If you have  any new symptoms after leaving the hospital (such as hives or swelling),  call your hospital at the correct number below. Or call your family doctor.  If you have breathing distress or wheezing, call 911.    Special instructions: ***    I have read and understand the above information.    Signature:______________________________________ Date:___________    Staff:__________________________________________ Date:___________     Time:__________    [facility] Departments:    ___[place]: [phone]  ___[facility]: [phone]  ___[place]: [phone] ___[facility]: [phone]  ___[facility]: [phone]  ___[facility]: [phone]  ___[phone]  ___[facility]: [phone]  ___[phone]

## 2024-11-19 ENCOUNTER — ANESTHESIA EVENT (OUTPATIENT)
Dept: SURGERY | Facility: AMBULATORY SURGERY CENTER | Age: 24
End: 2024-11-19
Payer: COMMERCIAL

## 2024-11-20 ENCOUNTER — ANESTHESIA (OUTPATIENT)
Dept: SURGERY | Facility: AMBULATORY SURGERY CENTER | Age: 24
End: 2024-11-20
Payer: COMMERCIAL

## 2024-11-20 ENCOUNTER — HOSPITAL ENCOUNTER (OUTPATIENT)
Facility: AMBULATORY SURGERY CENTER | Age: 24
Discharge: HOME | End: 2024-11-20
Attending: SURGERY
Payer: COMMERCIAL

## 2024-11-20 VITALS
WEIGHT: 160 LBS | HEART RATE: 84 BPM | BODY MASS INDEX: 20.53 KG/M2 | SYSTOLIC BLOOD PRESSURE: 123 MMHG | HEIGHT: 74 IN | DIASTOLIC BLOOD PRESSURE: 84 MMHG | TEMPERATURE: 97.3 F | OXYGEN SATURATION: 97 % | RESPIRATION RATE: 14 BRPM

## 2024-11-20 DIAGNOSIS — K60.30 ANAL FISTULA: Primary | ICD-10-CM

## 2024-11-20 RX ORDER — PROPOFOL 10 MG/ML
INJECTION, EMULSION INTRAVENOUS PRN
Status: DISCONTINUED | OUTPATIENT
Start: 2024-11-20 | End: 2024-11-20

## 2024-11-20 RX ORDER — CEFAZOLIN SODIUM 2 G/50ML
2 SOLUTION INTRAVENOUS
Status: COMPLETED | OUTPATIENT
Start: 2024-11-20 | End: 2024-11-20

## 2024-11-20 RX ORDER — GINSENG 100 MG
CAPSULE ORAL PRN
Status: DISCONTINUED | OUTPATIENT
Start: 2024-11-20 | End: 2024-11-20 | Stop reason: HOSPADM

## 2024-11-20 RX ORDER — SODIUM CHLORIDE, SODIUM LACTATE, POTASSIUM CHLORIDE, CALCIUM CHLORIDE 600; 310; 30; 20 MG/100ML; MG/100ML; MG/100ML; MG/100ML
INJECTION, SOLUTION INTRAVENOUS CONTINUOUS
Status: DISCONTINUED | OUTPATIENT
Start: 2024-11-20 | End: 2024-11-20 | Stop reason: HOSPADM

## 2024-11-20 RX ORDER — ACETAMINOPHEN 325 MG/1
975 TABLET ORAL ONCE
Status: DISCONTINUED | OUTPATIENT
Start: 2024-11-20 | End: 2024-11-20 | Stop reason: HOSPADM

## 2024-11-20 RX ORDER — FENTANYL CITRATE 50 UG/ML
50 INJECTION, SOLUTION INTRAMUSCULAR; INTRAVENOUS EVERY 5 MIN PRN
Status: DISCONTINUED | OUTPATIENT
Start: 2024-11-20 | End: 2024-11-20 | Stop reason: HOSPADM

## 2024-11-20 RX ORDER — METRONIDAZOLE 500 MG/100ML
500 INJECTION, SOLUTION INTRAVENOUS
Status: COMPLETED | OUTPATIENT
Start: 2024-11-20 | End: 2024-11-20

## 2024-11-20 RX ORDER — OXYCODONE HYDROCHLORIDE 5 MG/1
5-10 TABLET ORAL EVERY 4 HOURS PRN
Qty: 12 TABLET | Refills: 0 | Status: SHIPPED | OUTPATIENT
Start: 2024-11-20

## 2024-11-20 RX ORDER — IBUPROFEN 200 MG
200 TABLET ORAL EVERY 4 HOURS PRN
COMMUNITY

## 2024-11-20 RX ORDER — BUPIVACAINE HYDROCHLORIDE 2.5 MG/ML
INJECTION, SOLUTION EPIDURAL; INFILTRATION; INTRACAUDAL PRN
Status: DISCONTINUED | OUTPATIENT
Start: 2024-11-20 | End: 2024-11-20 | Stop reason: HOSPADM

## 2024-11-20 RX ORDER — DEXAMETHASONE SODIUM PHOSPHATE 10 MG/ML
4 INJECTION, SOLUTION INTRAMUSCULAR; INTRAVENOUS
Status: DISCONTINUED | OUTPATIENT
Start: 2024-11-20 | End: 2024-11-20 | Stop reason: HOSPADM

## 2024-11-20 RX ORDER — ONDANSETRON 2 MG/ML
4 INJECTION INTRAMUSCULAR; INTRAVENOUS EVERY 30 MIN PRN
Status: DISCONTINUED | OUTPATIENT
Start: 2024-11-20 | End: 2024-11-20 | Stop reason: HOSPADM

## 2024-11-20 RX ORDER — OXYCODONE HYDROCHLORIDE 5 MG/1
5 TABLET ORAL
Status: COMPLETED | OUTPATIENT
Start: 2024-11-20 | End: 2024-11-20

## 2024-11-20 RX ORDER — HYDROMORPHONE HYDROCHLORIDE 1 MG/ML
0.2 INJECTION, SOLUTION INTRAMUSCULAR; INTRAVENOUS; SUBCUTANEOUS EVERY 5 MIN PRN
Status: DISCONTINUED | OUTPATIENT
Start: 2024-11-20 | End: 2024-11-20 | Stop reason: HOSPADM

## 2024-11-20 RX ORDER — ACETAMINOPHEN 325 MG/1
650 TABLET ORAL
Status: DISCONTINUED | OUTPATIENT
Start: 2024-11-20 | End: 2024-11-21 | Stop reason: HOSPADM

## 2024-11-20 RX ORDER — FENTANYL CITRATE 50 UG/ML
25 INJECTION, SOLUTION INTRAMUSCULAR; INTRAVENOUS EVERY 5 MIN PRN
Status: DISCONTINUED | OUTPATIENT
Start: 2024-11-20 | End: 2024-11-20 | Stop reason: HOSPADM

## 2024-11-20 RX ORDER — PROPOFOL 10 MG/ML
INJECTION, EMULSION INTRAVENOUS CONTINUOUS PRN
Status: DISCONTINUED | OUTPATIENT
Start: 2024-11-20 | End: 2024-11-20

## 2024-11-20 RX ORDER — ACETAMINOPHEN 325 MG/1
325-650 TABLET ORAL EVERY 6 HOURS PRN
COMMUNITY

## 2024-11-20 RX ORDER — LIDOCAINE 40 MG/G
CREAM TOPICAL
Status: DISCONTINUED | OUTPATIENT
Start: 2024-11-20 | End: 2024-11-20 | Stop reason: HOSPADM

## 2024-11-20 RX ORDER — HYDROMORPHONE HYDROCHLORIDE 1 MG/ML
0.4 INJECTION, SOLUTION INTRAMUSCULAR; INTRAVENOUS; SUBCUTANEOUS EVERY 5 MIN PRN
Status: DISCONTINUED | OUTPATIENT
Start: 2024-11-20 | End: 2024-11-20 | Stop reason: HOSPADM

## 2024-11-20 RX ORDER — FENTANYL CITRATE 50 UG/ML
INJECTION, SOLUTION INTRAMUSCULAR; INTRAVENOUS PRN
Status: DISCONTINUED | OUTPATIENT
Start: 2024-11-20 | End: 2024-11-20

## 2024-11-20 RX ORDER — ONDANSETRON 2 MG/ML
4 INJECTION INTRAMUSCULAR; INTRAVENOUS ONCE
Status: COMPLETED | OUTPATIENT
Start: 2024-11-20 | End: 2024-11-20

## 2024-11-20 RX ORDER — CEFAZOLIN SODIUM 2 G/50ML
2 SOLUTION INTRAVENOUS SEE ADMIN INSTRUCTIONS
Status: DISCONTINUED | OUTPATIENT
Start: 2024-11-20 | End: 2024-11-20 | Stop reason: HOSPADM

## 2024-11-20 RX ORDER — NALOXONE HYDROCHLORIDE 0.4 MG/ML
0.1 INJECTION, SOLUTION INTRAMUSCULAR; INTRAVENOUS; SUBCUTANEOUS
Status: DISCONTINUED | OUTPATIENT
Start: 2024-11-20 | End: 2024-11-20 | Stop reason: HOSPADM

## 2024-11-20 RX ORDER — KETAMINE HYDROCHLORIDE 10 MG/ML
INJECTION INTRAMUSCULAR; INTRAVENOUS PRN
Status: DISCONTINUED | OUTPATIENT
Start: 2024-11-20 | End: 2024-11-20

## 2024-11-20 RX ORDER — ACETAMINOPHEN 325 MG/1
975 TABLET ORAL ONCE
Status: COMPLETED | OUTPATIENT
Start: 2024-11-20 | End: 2024-11-20

## 2024-11-20 RX ORDER — ONDANSETRON 4 MG/1
4 TABLET, ORALLY DISINTEGRATING ORAL EVERY 30 MIN PRN
Status: DISCONTINUED | OUTPATIENT
Start: 2024-11-20 | End: 2024-11-20 | Stop reason: HOSPADM

## 2024-11-20 RX ADMIN — PROPOFOL 30 MG: 10 INJECTION, EMULSION INTRAVENOUS at 11:29

## 2024-11-20 RX ADMIN — ONDANSETRON 4 MG: 2 INJECTION INTRAMUSCULAR; INTRAVENOUS at 10:51

## 2024-11-20 RX ADMIN — FENTANYL CITRATE 25 MCG: 50 INJECTION, SOLUTION INTRAMUSCULAR; INTRAVENOUS at 11:33

## 2024-11-20 RX ADMIN — SODIUM CHLORIDE, SODIUM LACTATE, POTASSIUM CHLORIDE, CALCIUM CHLORIDE: 600; 310; 30; 20 INJECTION, SOLUTION INTRAVENOUS at 11:23

## 2024-11-20 RX ADMIN — OXYCODONE HYDROCHLORIDE 5 MG: 5 TABLET ORAL at 12:44

## 2024-11-20 RX ADMIN — ACETAMINOPHEN 975 MG: 325 TABLET ORAL at 10:49

## 2024-11-20 RX ADMIN — PROPOFOL 20 MG: 10 INJECTION, EMULSION INTRAVENOUS at 11:53

## 2024-11-20 RX ADMIN — PROPOFOL 200 MCG/KG/MIN: 10 INJECTION, EMULSION INTRAVENOUS at 11:29

## 2024-11-20 RX ADMIN — METRONIDAZOLE 500 MG: 500 INJECTION, SOLUTION INTRAVENOUS at 11:07

## 2024-11-20 RX ADMIN — KETAMINE HYDROCHLORIDE 20 MG: 10 INJECTION INTRAMUSCULAR; INTRAVENOUS at 11:42

## 2024-11-20 RX ADMIN — CEFAZOLIN SODIUM 2 G: 2 SOLUTION INTRAVENOUS at 11:23

## 2024-11-20 NOTE — OP NOTE
"[facility] Colorectal Surgery Operative Report    PREOPERATIVE DIAGNOSIS:  1. History of anorectal infection.  2. Suspicion for anorectal fistula.       POSTOPERATIVE DIAGNOSIS:   1. History of anorectal infection.  2. Suspicion for anorectal fistula.    PROCEDURE:  1. Evaluation under anesthesia.  2. Lay-open fistulotomy    ANESTHESIA: MAC plus local anesthesia.    SURGEON:  Emeterio Lindsey MD, PhD    ASSISTANT(S): None.    INDICATIONS FOR PROCEDURE  Amador Jose is a 24 year old male who presented with clinical suspicion for an anorectal fistula. I thoroughly discussed the risks, benefits, and alternatives of operative treatment with the patient and he agreed to proceed.    General risks related to anorectal surgery were reviewed with the patient. These include, but are not limited to urinary retention, abscess, infection, bleeding, chronic pain, anal stenosis, fistula, fissure, and fecal incontinence.     OPERATIVE PROCEDURE: After obtaining informed consent, the patient was brought to the operating room and placed in the prone jackknife position. Appropriate preoperative mechanical deep venous thrombosis prophylaxis was administered. MAC anesthesia was gently induced. Bilateral lower extremity pneumatic compression devices were applied and all pressure points were cushioned. The perianal area was then preped and draped in the standard sterile fashion. After a \"time-out\" was performed, a total of 30 mL of Bupivacaine 0.25% without epinephrine was injected as a pudendal block. Digital rectal exam and anoscopy were performed. Findings: posterior midline intersphincteric fistula.     The fistula was layed open with monopolar electrocautery. The granulation tissue was curetted and the wound was marsupialized with a running locking 3-0 monocryl suture.     The distal rectum and anal canal were irrigated. Hemostasis was achieved. Instrument, sponge, and needle counts were all correct as reported to me. Bacitracin " was applied, as well as a sterile dressing. The patient tolerated the procedure well.    COMPLICATIONS: none, immediately.    ESTIMATED BLOOD LOSS: 5 mL.    SPECIMEN(S): None.    DRAINS/TUBES: None    OPERATIVE FINDINGS: Posterior midline inter-sphincteric fistula.    DISPOSITION: PACU.      Emeterio Lindsey MD, PhD  Division of Colon and Rectal Surgery  [facility]  [phone]      CC:  [facility] Surgery billing.  Keisha French NP.

## 2024-11-20 NOTE — ANESTHESIA POSTPROCEDURE EVALUATION
Patient: Amador Jose    Procedure: Procedure(s):  exam under anesthesia, fistulotomy       Anesthesia Type:  MAC    Note:  Disposition: Outpatient   Postop Pain Control: Uneventful            Sign Out: Well controlled pain   PONV: No   Neuro/Psych: Uneventful            Sign Out: Acceptable/Baseline neuro status   Airway/Respiratory: Uneventful            Sign Out: Acceptable/Baseline resp. status   CV/Hemodynamics: Uneventful            Sign Out: Acceptable CV status; No obvious hypovolemia; No obvious fluid overload   Other NRE: NONE   DID A NON-ROUTINE EVENT OCCUR? No           Last vitals:  Vitals Value Taken Time   /98 11/20/24 1234   Temp 36.6  C (97.8  F) 11/20/24 1216   Pulse 95 11/20/24 1234   Resp 16 11/20/24 1230   SpO2 100 % 11/20/24 1243   Vitals shown include unfiled device data.    Electronically Signed By: Anabela Hannon MD  November 20, 2024  12:43 PM

## 2024-11-20 NOTE — DISCHARGE INSTRUCTIONS
"[facility]  Home Care Following Anesthesia  For 24 hours after surgery:  Get plenty of rest.  A responsible adult must stay with you for at least 24 hours after you leave the surgery center.  Do not drive or use heavy equipment.  If you have weakness or tingling, don't drive or use heavy equipment until this feeling goes away.   Do not drink alcohol.   Avoid strenuous or risky activities.  Ask for help when climbing stairs.  You may feel lightheaded.  IF so, sit for a few minutes before standing.  Have someone help you get up.   If you have nausea (feel sick to your stomach): Drink only clear liquids such as apple juice, ginger ale, broth or 7-Up.  Rest may also help.  Be sure to drink enough fluids.  Move to a regular diet as you feel able.   You may have a slight fever.  Call the doctor if your fever is over 100 F (37.7 C) (taken under the tongue) or lasts longer than 24 hours.  You may have a dry mouth, a sore throat, muscle aches or trouble sleeping. These should go away after 24 hours.  Do not make important or legal decisions.   It is recommended to avoid smoking.        Today you received a Marcaine or bupivacaine block to numb the nerves near your surgery site.  This is a block using local anesthetic or \"numbing\" medication injected around the nerves to anesthetize or \"numb\" the area supplied by those nerves.  This block is injected into the muscle layer near your surgical site.  The medication may numb the location where you had surgery for 6-18 hours, but may last up to 24 hours.  If your surgical site is an arm or leg you should be careful with your affected limb, since it is possible to injure your limb without being aware of it due to the numbing.  Until full feeling returns, you should guard against bumping or hitting your limb, and avoid extreme hot or cold temperatures on the skin.  As the block wears off, the feeling will return as a tingling or prickly " sensation near your surgical site.  You will experience more discomfort from your incision as the feeling returns.  You may want to take a pain pill (a narcotic or Tylenol if this was prescribed by your surgeon) when you start to experience mild pain before the pain beccomes more severe.  If your pain medications do not control your pain you should notifiy your surgeon.    Tips for taking pain medications  To get the best pain relief possible, remember these points:  Take pain medications as directed, before pain becomes severe.  Pain medication can upset your stomach: taking it with food may help.  Constipation is a common side effect of pain medication. Drink plenty of  fluids.  Eat foods high in fiber. Take a stool softener if recommended by your doctor or pharmacist.  Do not drink alcohol, drive or operate machinery while taking pain medications.  Ask about other ways to control pain, such as with heat, ice or relaxation.    Tylenol/Acetaminophen Consumption    If you feel your pain relief is insufficient, you may take Tylenol/Acetaminophen in addition to your narcotic pain medication.   Be careful not to exceed 4,000 mg of Tylenol/Acetaminophen in a 24 hour period from all sources.  If you are taking extra strength Tylenol/acetaminophen (500 mg), the maximum dose is 8 tablets in 24 hours.  If you are taking regular strength acetaminophen (325 mg), the maximum dose is 12 tablets in 24 hours.    Call a doctor for any of the following:  Signs of infection (fever, growing tenderness at the surgery site, a large amount of drainage or bleeding, severe pain, foul-smelling drainage, redness, swelling).  It has been over 8 to 10 hours since surgery and you are still not able to urinate (pass water).  Headache for over 24 hours.  Numbness, tingling or weakness the day after surgery (if you had spinal anesthesia).  Signs of Covid-19 infection (temperature over 100 degrees, shortness of breath, cough, loss of taste/smell,  generalized body aches, persistent headache, chills, sore throat, nausea/vomiting/diarrhea)  Your doctor is:  Dr. Emeterio Lindsey, Colon Rectal: [phone]  Or dial [phone] and ask for the resident on call for:  Colon Rectal  For emergency care, call the:  [place]:  [phone] (TTY for hearing impaired: [phone])  Tylenol 975 mg given at 1045 am.   Ok to take more after 445 pm.

## 2024-11-20 NOTE — ANESTHESIA CARE TRANSFER NOTE
Patient: Amador Jose    Procedure: Procedure(s):  exam under anesthesia, fistulotomy       Diagnosis: Anal fistula [K60.30]  Diagnosis Additional Information: No value filed.    Anesthesia Type:   MAC     Note:    Oropharynx: oropharynx clear of all foreign objects and spontaneously breathing  Level of Consciousness: awake  Oxygen Supplementation: room air    Independent Airway: airway patency satisfactory and stable  Dentition: dentition unchanged  Vital Signs Stable: post-procedure vital signs reviewed and stable  Report to RN Given: handoff report given  Patient transferred to: Phase II  Comments: Report to Phase II RN. Resps easy and reg.   Handoff Report: Identifed the Patient, Identified the Reponsible Provider, Reviewed the pertinent medical history, Discussed the surgical course, Reviewed Intra-OP anesthesia mangement and issues during anesthesia, Set expectations for post-procedure period and Allowed opportunity for questions and acknowledgement of understanding      Vitals:  Vitals Value Taken Time   /86 11/20/24 1217   Temp 36.6  C (97.8  F) 11/20/24 1216   Pulse 94 11/20/24 1217   Resp 16 11/20/24 1216   SpO2 99 % 11/20/24 1221   Vitals shown include unfiled device data.    Electronically Signed By: FRANCIS Vaz CRNA  November 20, 2024  12:21 PM

## 2024-11-20 NOTE — ANESTHESIA PREPROCEDURE EVALUATION
Anesthesia Pre-Procedure Evaluation    Patient: Amador Jose   MRN: [id number] : 2000        Procedure : Procedure(s):  exam under anesthesia, possible fistulotomy, possible seton          No past medical history on file.   Past Surgical History:   Procedure Laterality Date    DAVINCI PYELOPLASTY Right 2023    Procedure: robotic assisted laparoscopic right pyeloplasty, right ureteral stent placement;  Surgeon: Neeraj Mckee MD;  Location: [facility] OR    LAPAROSCOPIC APPENDECTOMY N/A 2024    Procedure: Laparoscopic appendectomy;  Surgeon: Bronwyn Brothers MD;  Location: [facility] OR      Allergies   Allergen Reactions    Fluorescein-Benoxinate Other (See Comments)      Social History     Tobacco Use    Smoking status: Never    Smokeless tobacco: Never   Substance Use Topics    Alcohol use: Not on file      Wt Readings from Last 1 Encounters:   10/08/24 72.6 kg (160 lb)        Anesthesia Evaluation   Pt has had prior anesthetic. Type: General.    No history of anesthetic complications       ROS/MED HX  ENT/Pulmonary:       Neurologic:       Cardiovascular:       METS/Exercise Tolerance: >4 METS    Hematologic:       Musculoskeletal:       GI/Hepatic: Comment: Anal fistula      Renal/Genitourinary:       Endo:       Psychiatric/Substance Use:       Infectious Disease:       Malignancy:       Other:            Physical Exam    Airway        Mallampati: II   TM distance: > 3 FB   Neck ROM: full   Mouth opening: > 3 cm    Respiratory Devices and Support         Dental       (+) Completely normal teeth      Cardiovascular   cardiovascular exam normal          Pulmonary   pulmonary exam normal                OUTSIDE LABS:  CBC:   Lab Results   Component Value Date    WBC 21.4 (H) 2024    WBC 13.0 (H) 2023    HGB 17.1 2024    HGB 14.1 2023    HCT 46.8 2024    HCT 39.5 (L) 2023     2024     2023     BMP:   Lab Results   Component Value Date  "    08/12/2024     05/21/2024    POTASSIUM 4.0 08/12/2024    POTASSIUM 3.6 05/21/2024    CHLORIDE 100 08/12/2024    CHLORIDE 96 (L) 05/21/2024    CO2 25 08/12/2024    CO2 26 05/21/2024    BUN 12.7 08/12/2024    BUN 11.6 05/21/2024    CR 1.01 08/12/2024    CR 0.95 05/21/2024    GLC 92 08/12/2024     (H) 05/21/2024     COAGS: No results found for: \"PTT\", \"INR\", \"FIBR\"  POC: No results found for: \"BGM\", \"HCG\", \"HCGS\"  HEPATIC:   Lab Results   Component Value Date    ALBUMIN 5.2 05/21/2024    PROTTOTAL 7.5 05/21/2024    ALT 24 05/21/2024    AST 23 05/21/2024    ALKPHOS 61 05/21/2024    BILITOTAL 1.3 (H) 05/21/2024     OTHER:   Lab Results   Component Value Date    RANDI 9.2 08/12/2024    LIPASE 20 05/21/2024       Anesthesia Plan    ASA Status:  1    NPO Status:  NPO Appropriate    Anesthesia Type: MAC.   Induction: Intravenous.   Maintenance: TIVA.        Consents    Anesthesia Plan(s) and associated risks, benefits, and realistic alternatives discussed. Questions answered and patient/representative(s) expressed understanding.     - Discussed: Risks, Benefits and Alternatives for BOTH SEDATION and the PROCEDURE were discussed     - Discussed with:  Patient            Postoperative Care    Pain management: IV analgesics, Oral pain medications, Multi-modal analgesia.   PONV prophylaxis: Ondansetron (or other 5HT-3), Background Propofol Infusion     Comments:               Anabela Hannon MD    I have reviewed the pertinent notes and labs in the chart from the past 30 days and (re)examined the patient.  Any updates or changes from those notes are reflected in this note.                                   "

## 2024-11-27 ENCOUNTER — OFFICE VISIT (OUTPATIENT)
Dept: SURGERY | Facility: CLINIC | Age: 24
End: 2024-11-27
Payer: COMMERCIAL

## 2024-11-27 VITALS
HEART RATE: 94 BPM | HEIGHT: 74 IN | OXYGEN SATURATION: 98 % | SYSTOLIC BLOOD PRESSURE: 159 MMHG | WEIGHT: 156.5 LBS | BODY MASS INDEX: 20.09 KG/M2 | DIASTOLIC BLOOD PRESSURE: 121 MMHG

## 2024-11-27 DIAGNOSIS — K60.30 ANAL FISTULA: ICD-10-CM

## 2024-11-27 DIAGNOSIS — Z09 FOLLOW-UP EXAMINATION AFTER COLORECTAL SURGERY: Primary | ICD-10-CM

## 2024-11-27 ASSESSMENT — PAIN SCALES - GENERAL: PAINLEVEL_OUTOF10: NO PAIN (0)

## 2024-11-27 NOTE — LETTER
"2024       RE: Amador Jose  [address] [facility] [address]     Dear Colleague,    Thank you for referring your patient, Amador Jose, to the [facility] COLON AND RECTAL SURGERY CLINIC [place] at [facility]. Please see a copy of my visit note below.    Colon and Rectal Surgery Postoperative Clinic Note    RE: Amador Jose  : 2000  VASHTI: 2024    Amador Jose is a very pleasant 24 year old male status post EUA with lay-open fistulotomy on 24 by Dr. Lindsey.     Interval history: Amador has been doing well. No pain. Minimal drainage. No difficulty with bowel movements.     Physical Examination: Exam was chaperoned by Christy Valdivia EMT   BP (!) 159/121 (BP Location: Left arm, Patient Position: Sitting, Cuff Size: Adult Regular)   Pulse 94   Ht 6' 2\"   Wt 156 lb 8 oz   SpO2 98%   BMI 20.09 kg/m    General: alert, oriented, in no acute distress, sitting comfortably  HEENT: mucous membranes moist    Perianal external examination:  Fistulotomy site in the posterior position remains open with good granulation tissue present. No erythema or drainage.    Digital rectal examination: Was deferred.    Anoscopy: Was deferred.    Assessment/Plan:  24 year old male status post EUA with lay-open fistulotomy on 24 by Dr. Lindsey. He is healing well. No signs of infection. No difficulty with bowel movements. Can follow up as needed. Patient's questions were answered to his stated satisfaction and he is in agreement with this plan.     Medical history:  No past medical history on file.    Surgical history:  Past Surgical History:   Procedure Laterality Date     DAVINCI PYELOPLASTY Right 2023    Procedure: robotic assisted laparoscopic right pyeloplasty, right ureteral stent placement;  Surgeon: Neeraj Mckee MD;  Location: [facility] OR     FISTULOTOMY RECTUM N/A 2024    Procedure: exam under anesthesia, " "fistulotomy;  Surgeon: Emeterio Lindsey MD;  Location: [facility] OR     LAPAROSCOPIC APPENDECTOMY N/A 5/21/2024    Procedure: Laparoscopic appendectomy;  Surgeon: Bronwyn Brothers MD;  Location: [facility] OR       Problem list:    Patient Active Problem List    Diagnosis Date Noted     Anal fistula 10/08/2024     Priority: Medium     UPJ obstruction, congenital 07/25/2023     Priority: Medium       Medications:  Current Outpatient Medications   Medication Sig Dispense Refill     acetaminophen (TYLENOL) 325 MG tablet Take 325-650 mg by mouth every 6 hours as needed for mild pain. (Patient not taking: Reported on 11/27/2024)       ibuprofen (ADVIL/MOTRIN) 200 MG tablet Take 200 mg by mouth every 4 hours as needed for pain. (Patient not taking: Reported on 11/27/2024)       oxyCODONE (ROXICODONE) 5 MG tablet Take 1-2 tablets (5-10 mg) by mouth every 4 hours as needed for moderate to severe pain. (Patient not taking: Reported on 11/27/2024) 12 tablet 0       Allergies:  Allergies   Allergen Reactions     Fluorescein-Benoxinate Other (See Comments)       Family history:  No family history on file.    Social history:  Social History     Tobacco Use     Smoking status: Never     Smokeless tobacco: Never   Substance Use Topics     Alcohol use: Not on file     Marital status: single.    Nursing Notes:   Christy Valdivia  11/27/2024 10:20 AM  Signed  Chief Complaint   Patient presents with     Post-op Visit       Vitals:    11/27/24 1017   BP: (!) 159/121   BP Location: Left arm   Patient Position: Sitting   Cuff Size: Adult Regular   Pulse: 94   SpO2: 98%   Weight: 156 lb 8 oz   Height: 6' 2\"       Body mass index is 20.09 kg/m .    Christy Valdivia, EMT     15 minutes spent on the date of the encounter doing chart review, history and exam, documentation and further activities as noted above.   This is a postop visit.    FRANCIS Robbins, NP-C  Colon and Rectal Surgery  [facility]" Center    This note was created using speech recognition software and may contain unintended word substitutions.        Again, thank you for allowing me to participate in the care of your patient.      Sincerely,    FRANCIS Salter CNP

## 2024-11-27 NOTE — NURSING NOTE
"Chief Complaint   Patient presents with    Post-op Visit       Vitals:    11/27/24 1017   BP: (!) 159/121   BP Location: Left arm   Patient Position: Sitting   Cuff Size: Adult Regular   Pulse: 94   SpO2: 98%   Weight: 156 lb 8 oz   Height: 6' 2\"       Body mass index is 20.09 kg/m .    Christy Valdivia, EMT  "

## 2024-11-27 NOTE — PROGRESS NOTES
"Colon and Rectal Surgery Postoperative Clinic Note    RE: Amador Jose  : 2000  VASHTI: 2024    Amador Jose is a very pleasant 24 year old male status post EUA with lay-open fistulotomy on 24 by Dr. Lindsey.     Interval history: Amador has been doing well. No pain. Minimal drainage. No difficulty with bowel movements.     Physical Examination: Exam was chaperoned by Christy Valdivia EMT   BP (!) 159/121 (BP Location: Left arm, Patient Position: Sitting, Cuff Size: Adult Regular)   Pulse 94   Ht 6' 2\"   Wt 156 lb 8 oz   SpO2 98%   BMI 20.09 kg/m    General: alert, oriented, in no acute distress, sitting comfortably  HEENT: mucous membranes moist    Perianal external examination:  Fistulotomy site in the posterior position remains open with good granulation tissue present. No erythema or drainage.    Digital rectal examination: Was deferred.    Anoscopy: Was deferred.    Assessment/Plan:  24 year old male status post EUA with lay-open fistulotomy on 24 by Dr. Lindsey. He is healing well. No signs of infection. No difficulty with bowel movements. Can follow up as needed. Patient's questions were answered to his stated satisfaction and he is in agreement with this plan.     Medical history:  No past medical history on file.    Surgical history:  Past Surgical History:   Procedure Laterality Date    DAVINCI PYELOPLASTY Right 2023    Procedure: robotic assisted laparoscopic right pyeloplasty, right ureteral stent placement;  Surgeon: Neeraj Mckee MD;  Location: [facility] OR    FISTULOTOMY RECTUM N/A 2024    Procedure: exam under anesthesia, fistulotomy;  Surgeon: Emeterio Lindsey MD;  Location: [facility] OR    LAPAROSCOPIC APPENDECTOMY N/A 2024    Procedure: Laparoscopic appendectomy;  Surgeon: Bronwyn Brothers MD;  Location: [facility] OR       Problem list:    Patient Active Problem List    Diagnosis Date Noted    Anal fistula 10/08/2024     Priority: Medium " "   UPJ obstruction, congenital 07/25/2023     Priority: Medium       Medications:  Current Outpatient Medications   Medication Sig Dispense Refill    acetaminophen (TYLENOL) 325 MG tablet Take 325-650 mg by mouth every 6 hours as needed for mild pain. (Patient not taking: Reported on 11/27/2024)      ibuprofen (ADVIL/MOTRIN) 200 MG tablet Take 200 mg by mouth every 4 hours as needed for pain. (Patient not taking: Reported on 11/27/2024)      oxyCODONE (ROXICODONE) 5 MG tablet Take 1-2 tablets (5-10 mg) by mouth every 4 hours as needed for moderate to severe pain. (Patient not taking: Reported on 11/27/2024) 12 tablet 0       Allergies:  Allergies   Allergen Reactions    Fluorescein-Benoxinate Other (See Comments)       Family history:  No family history on file.    Social history:  Social History     Tobacco Use    Smoking status: Never    Smokeless tobacco: Never   Substance Use Topics    Alcohol use: Not on file     Marital status: single.    Nursing Notes:   Christy Valdivia  11/27/2024 10:20 AM  Signed  Chief Complaint   Patient presents with    Post-op Visit       Vitals:    11/27/24 1017   BP: (!) 159/121   BP Location: Left arm   Patient Position: Sitting   Cuff Size: Adult Regular   Pulse: 94   SpO2: 98%   Weight: 156 lb 8 oz   Height: 6' 2\"       Body mass index is 20.09 kg/m .    MADAN Kulkarni     15 minutes spent on the date of the encounter doing chart review, history and exam, documentation and further activities as noted above.   This is a postop visit.    FRANCIS Robbins, NP-C  Colon and Rectal Surgery  [facility]    This note was created using speech recognition software and may contain unintended word substitutions.      "

## 2024-12-02 ENCOUNTER — PRE VISIT (OUTPATIENT)
Dept: SURGERY | Facility: CLINIC | Age: 24
End: 2024-12-02

## 2025-02-16 ENCOUNTER — HEALTH MAINTENANCE LETTER (OUTPATIENT)
Age: 25
End: 2025-02-16

## (undated) DEVICE — CLIP ENDO HEMO-LOC PURPLE LG 544240

## (undated) DEVICE — ENDO SCOPE WARMER SEAL  C3101

## (undated) DEVICE — DRSG GAUZE 4X4" TRAY 6939

## (undated) DEVICE — SU SILK 2-0 FSL 18" 677G

## (undated) DEVICE — SUCTION MANIFOLD NEPTUNE 2 SYS 4 PORT 0702-020-000

## (undated) DEVICE — ESU GROUND PAD ADULT W/CORD E7507

## (undated) DEVICE — SU SILK 2-0 TIE 12X30" A305H

## (undated) DEVICE — ESU LIGASURE MARYLAND LAPAROSCOPIC SLR/DVDR 5MMX37CM LF1937

## (undated) DEVICE — SYSTEM LAPAROVUE VISIBILITY LAPVUE10

## (undated) DEVICE — ENDO TROCAR SLEEVE KII Z-THREADED 05X100MM CTS02

## (undated) DEVICE — SOL NACL 0.9% INJ 1000ML BAG 2B1324X

## (undated) DEVICE — SOL BENZOIN 0.5OZ

## (undated) DEVICE — SU VICRYL 4-0 RB-1 27" J304

## (undated) DEVICE — PREP POVIDONE-IODINE 10% SOLUTION 4OZ BOTTLE MDS093944

## (undated) DEVICE — ANTIFOG SOLUTION W/FOAM PAD 31142527

## (undated) DEVICE — LINEN TOWEL PACK X6 WHITE 5487

## (undated) DEVICE — ESU GROUND PAD UNIVERSAL W/O CORD

## (undated) DEVICE — ESU PENCIL SMOKE EVAC W/ROCKER SWITCH 0703-047-000

## (undated) DEVICE — Device

## (undated) DEVICE — TROCAR AIRSEAL BLADELESS 12X120MM IAS12-120

## (undated) DEVICE — STRAP KNEE/BODY 31143004

## (undated) DEVICE — LINEN TOWEL PACK X5 5464

## (undated) DEVICE — SU VICRYL 4-0 PS-2 18" UND J496H

## (undated) DEVICE — GLOVE BIOGEL PI MICRO INDICATOR UNDERGLOVE SZ 8.0 48980

## (undated) DEVICE — COVER CAMERA IN-LIGHT LENS LT-C02-P

## (undated) DEVICE — ADH LIQUID MASTISOL TOPICAL VIAL 2-3ML 0523-48

## (undated) DEVICE — RX BACITRACIN OINTMENT 14G 0.5OZ 45802-060-01

## (undated) DEVICE — SU SILK 2-0 SH 30" K833H

## (undated) DEVICE — DAVINCI XI SEAL UNIVERSAL 5-8MM 470361

## (undated) DEVICE — SOL NACL 0.9% IRRIG 1000ML BOTTLE 2F7124

## (undated) DEVICE — TUBING SMOKE EVAC PNEUMOCLEAR HIGH FLOW 0620050250

## (undated) DEVICE — ENDO TROCAR FIRST ENTRY KII FIOS Z-THRD 12X100MM CTF73

## (undated) DEVICE — GLOVE BIOGEL PI MICRO SZ 8.0 48580

## (undated) DEVICE — DAVINCI HOT SHEARS TIP COVER  400180

## (undated) DEVICE — ESU ENDO SCISSORS 5MM CVD 5DCS

## (undated) DEVICE — SUCTION IRR STRYKERFLOW II W/TIP 250-070-520

## (undated) DEVICE — NDL INSUFFLATION 13GA 120MM C2201

## (undated) DEVICE — SU VICRYL 0 CT-1 27" J340H

## (undated) DEVICE — SU MONOCRYL 3-0 SH 27" UND Y416H

## (undated) DEVICE — DRSG STERI STRIP 1/2X4" R1547

## (undated) DEVICE — ENDO POUCH UNIVERSAL RETRIEVAL SYSTEM INZII 5MM CD003

## (undated) DEVICE — SYR 10ML FINGER CONTROL W/O NDL 309695

## (undated) DEVICE — TAPE DURAPORE 3" SILK 1538-3

## (undated) DEVICE — SOL WATER IRRIG 1000ML BOTTLE 2F7114

## (undated) DEVICE — TUBING CONMED AIRSEAL SMOKE EVAC INSUFFLATION ASM-EVAC

## (undated) DEVICE — BLADE CLIPPER 4406

## (undated) DEVICE — CATH TRAY FOLEY SURESTEP 16FR W/URNE MTR STLK LATEX A303316A

## (undated) DEVICE — SU DERMABOND ADVANCED .7ML DNX12

## (undated) DEVICE — LIGHT HANDLE X1 31140133

## (undated) DEVICE — PREP CHLORAPREP 26ML TINTED HI-LITE ORANGE 930815

## (undated) DEVICE — WIPES FOLEY CARE SURESTEP PROVON DFC100

## (undated) DEVICE — GUIDEWIRE SENSOR DUAL FLEX STR 0.035"X150CM M0066703080

## (undated) DEVICE — DAVINCI XI DRAPE COLUMN 470341

## (undated) DEVICE — GLOVE BIOGEL PI MICRO INDICATOR UNDERGLOVE SZ 7.5 48975

## (undated) DEVICE — SU VICRYL 0 TIE 54" J608H

## (undated) DEVICE — SU MONOCRYL 4-0 PS-2 18" UND Y496G

## (undated) DEVICE — GLOVE BIOGEL PI MICRO SZ 7.0 48570

## (undated) DEVICE — DRSG PRIMAPORE 02X3" 7133

## (undated) DEVICE — ESU ELEC NDL 1" COATED/INSULATED E1465

## (undated) DEVICE — PACK RECTAL KIT CUSTOM ASC

## (undated) DEVICE — DAVINCI XI DRAPE ARM 470015

## (undated) DEVICE — PACK DAVINCI UROLOGY SBA15UDFSG

## (undated) DEVICE — DEVICE SUTURE PASSER 14GA WECK EFX EFXSP2

## (undated) DEVICE — RULER SURGICAL PLASTIC STRL LF CS628

## (undated) DEVICE — SOL WATER IRRIG 500ML BOTTLE 2F7113

## (undated) DEVICE — ENDO TROCAR FIRST ENTRY KII FIOS Z-THRD 05X100MM CTF03

## (undated) DEVICE — SU PDS II 0 ENDOLOOP EZ10G

## (undated) RX ORDER — PROPOFOL 10 MG/ML
INJECTION, EMULSION INTRAVENOUS
Status: DISPENSED
Start: 2024-11-20

## (undated) RX ORDER — ONDANSETRON 2 MG/ML
INJECTION INTRAMUSCULAR; INTRAVENOUS
Status: DISPENSED
Start: 2024-11-20

## (undated) RX ORDER — METRONIDAZOLE 500 MG/100ML
INJECTION, SOLUTION INTRAVENOUS
Status: DISPENSED
Start: 2024-05-21

## (undated) RX ORDER — ACETAMINOPHEN 325 MG/1
TABLET ORAL
Status: DISPENSED
Start: 2024-11-20

## (undated) RX ORDER — GLYCOPYRROLATE 0.2 MG/ML
INJECTION, SOLUTION INTRAMUSCULAR; INTRAVENOUS
Status: DISPENSED
Start: 2023-07-25

## (undated) RX ORDER — FENTANYL CITRATE 0.05 MG/ML
INJECTION, SOLUTION INTRAMUSCULAR; INTRAVENOUS
Status: DISPENSED
Start: 2023-07-25

## (undated) RX ORDER — FENTANYL CITRATE 50 UG/ML
INJECTION, SOLUTION INTRAMUSCULAR; INTRAVENOUS
Status: DISPENSED
Start: 2024-11-20

## (undated) RX ORDER — METRONIDAZOLE 500 MG/100ML
INJECTION, SOLUTION INTRAVENOUS
Status: DISPENSED
Start: 2024-11-20

## (undated) RX ORDER — CEFAZOLIN SODIUM 2 G/50ML
SOLUTION INTRAVENOUS
Status: DISPENSED
Start: 2024-11-20

## (undated) RX ORDER — FENTANYL CITRATE 50 UG/ML
INJECTION, SOLUTION INTRAMUSCULAR; INTRAVENOUS
Status: DISPENSED
Start: 2023-07-25

## (undated) RX ORDER — FUROSEMIDE 10 MG/ML
INJECTION INTRAMUSCULAR; INTRAVENOUS
Status: DISPENSED
Start: 2023-07-11

## (undated) RX ORDER — NEOSTIGMINE METHYLSULFATE 1 MG/ML
VIAL (ML) INJECTION
Status: DISPENSED
Start: 2023-07-25

## (undated) RX ORDER — FENTANYL CITRATE 50 UG/ML
INJECTION, SOLUTION INTRAMUSCULAR; INTRAVENOUS
Status: DISPENSED
Start: 2024-05-21

## (undated) RX ORDER — HEPARIN SODIUM 5000 [USP'U]/.5ML
INJECTION, SOLUTION INTRAVENOUS; SUBCUTANEOUS
Status: DISPENSED
Start: 2024-05-21

## (undated) RX ORDER — FUROSEMIDE 10 MG/ML
INJECTION INTRAMUSCULAR; INTRAVENOUS
Status: DISPENSED
Start: 2023-10-27

## (undated) RX ORDER — BUPIVACAINE HYDROCHLORIDE AND EPINEPHRINE 2.5; 5 MG/ML; UG/ML
INJECTION, SOLUTION EPIDURAL; INFILTRATION; INTRACAUDAL; PERINEURAL
Status: DISPENSED
Start: 2024-05-21

## (undated) RX ORDER — LIDOCAINE HYDROCHLORIDE AND EPINEPHRINE 10; 10 MG/ML; UG/ML
INJECTION, SOLUTION INFILTRATION; PERINEURAL
Status: DISPENSED
Start: 2024-05-21

## (undated) RX ORDER — OXYCODONE HYDROCHLORIDE 5 MG/1
TABLET ORAL
Status: DISPENSED
Start: 2024-11-20

## (undated) RX ORDER — ACETAMINOPHEN 325 MG/1
TABLET ORAL
Status: DISPENSED
Start: 2024-05-21

## (undated) RX ORDER — HYDROMORPHONE HYDROCHLORIDE 1 MG/ML
INJECTION, SOLUTION INTRAMUSCULAR; INTRAVENOUS; SUBCUTANEOUS
Status: DISPENSED
Start: 2023-07-25

## (undated) RX ORDER — HYDROMORPHONE HYDROCHLORIDE 1 MG/ML
INJECTION, SOLUTION INTRAMUSCULAR; INTRAVENOUS; SUBCUTANEOUS
Status: DISPENSED
Start: 2024-05-21

## (undated) RX ORDER — FUROSEMIDE 10 MG/ML
INJECTION INTRAMUSCULAR; INTRAVENOUS
Status: DISPENSED
Start: 2024-08-12

## (undated) RX ORDER — BUPIVACAINE HYDROCHLORIDE 2.5 MG/ML
INJECTION, SOLUTION EPIDURAL; INFILTRATION; INTRACAUDAL
Status: DISPENSED
Start: 2023-07-25

## (undated) RX ORDER — CEFAZOLIN SODIUM 1 G/3ML
INJECTION, POWDER, FOR SOLUTION INTRAMUSCULAR; INTRAVENOUS
Status: DISPENSED
Start: 2023-07-25